# Patient Record
Sex: FEMALE | Race: WHITE | Employment: UNEMPLOYED | URBAN - METROPOLITAN AREA
[De-identification: names, ages, dates, MRNs, and addresses within clinical notes are randomized per-mention and may not be internally consistent; named-entity substitution may affect disease eponyms.]

---

## 2017-07-26 ENCOUNTER — HOSPITAL ENCOUNTER (OUTPATIENT)
Dept: PHYSICAL THERAPY | Age: 57
Discharge: HOME | End: 2017-07-26
Payer: OTHER GOVERNMENT

## 2017-07-26 PROCEDURE — 97530 THERAPEUTIC ACTIVITIES: CPT

## 2017-07-26 PROCEDURE — 97161 PT EVAL LOW COMPLEX 20 MIN: CPT

## 2017-07-26 NOTE — PROGRESS NOTES
[facility] at [address] [place] [address]  Phone: [phone]   Fax: [phone]    Plan of Care/ Statement of Necessity for Physical Therapy Services     Patient name: Serafin Trammell Start of Care: 2017   Referral source: Marybel Morrissey, * : 1960    Medical Diagnosis: m54.2   Onset Date:chronic for 5 years    Treatment Diagnosis: cervical spine pain   Prior Hospitalization: see medical history Provider#: [id number]   Medications: Verified on Patient summary List    Comorbidities: fibromyalgia, panic attacks   Prior Level of Function: chronic neck pain, 5 years    The Plan of Care and following information is based on the information from the initial evaluation. Assessment/ key information: Pt is a 56 y.o. female presenting to therapy with neck pain and diagnosis of cervicalgia per MD script. Pt's pain has been chronic in nature, >5 years, no improvements or exacerbations. Pt impairments include minor limitations and pain with ROM, tenderness to right UT, tightness in scalenes and SCM, decreased MT and LT strength, high pain, and reports of intermittent UE tingling (right>left). Pt functional limitations include pain/difficulty looking up, watching TV, looking down to read, lifting heavy objects, carrying purse/groceries. Pt would benefit from skilled PT to improve impairments listed above and return pt to prior level of function of less pain with daily activities.    Evaluation Complexity History MEDIUM  Complexity : 1-2 comorbidities / personal factors will impact the outcome/ POC ; Examination HIGH Complexity : 4+ Standardized tests and measures addressing body structure, function, activity limitation and / or participation in recreation  ;Presentation LOW Complexity : Stable, uncomplicated  ;Clinical Decision Making MEDIUM Complexity : FOTO score of 26-74  Overall Complexity Rating: LOW   Problem List: pain affecting function, decrease ROM, decrease strength, decrease ADL/ functional abilitiies, decrease activity tolerance and decrease flexibility/ joint mobility   Treatment Plan may include any combination of the following: Therapeutic exercise, Therapeutic activities, Neuromuscular re-education, Physical agent/modality, Manual therapy, Patient education and Self Care training  Patient / Family readiness to learn indicated by: asking questions, trying to perform skills and interest  Persons(s) to be included in education: patient (P)  Barriers to Learning/Limitations: None  Patient Goal (s): Definately less pain, I don't know if cervical disease can be cured or not. Mainly for me the pain. Not be so stiff.   Patient Self Reported Health Status: poor  Rehabilitation Potential: fair-good    Short Term Goals: STG- To be accomplished in 4 week(s):  1. Pt will be independent with HEP to encourage prophylaxis. Eval:to dispensed visit 2-3  Current: NA     2.  Pt will report pain at worst does not exceed 6/10 to improve everyday activities. Eval:8/10  Current: NA     Long Term Goals: LTG- To be accomplished in 6 week(s):  1. Pt will report pain at worst does not exceed 4/10 to allow pt to watch TV or read with no restrictions. Eval:8/10  Current: NA     2.  Pt will report >50% improvement in UE symptoms to allow pt to improve and normalize ADLs. Eval:basline  Current: NA     3. Pt MT/LT strength will improve to 4/5 to allow pt to lift and carry groceries with less restrictions. Eval:3+ LT, 4 MT  Current: NA  Frequency / Duration: Patient to be seen 2 times per week for 6 weeks. Patient/ Caregiver education and instruction: Diagnosis, prognosis, self care, activity modification and exercises   [x]  Plan of care has been reviewed with MIKEY Miller, PT 7/26/2017 3:18 PM  _____________________________________________________________________  I certify that the above Therapy Services are being furnished while the patient is under my care. I agree with the treatment plan and certify that this therapy is necessary.     Physician's Signature:____________________  Date:__________Time:______    Please sign and return to [facility] at [address] [place] [address]  Phone: [phone]   Fax: [phone]

## 2017-07-26 NOTE — PROGRESS NOTES
PT DAILY TREATMENT NOTE/CERVICAL EVAL3-16    Patient Name: Isis Cook  Date:2017  : 1960  [x]  Patient  Verified  Payor:  / Plan: [facility]  RETIREES AND DEPENDENTS / Product Type:  /    In [facility] time:348  Total Treatment Time (min): 24  Visit #: 1 of 12    Treatment Area: m54.2    SUBJECTIVE  Pain Level (0-10 scale): 4  []constant []intermittent []improving []worsening []no change since onset    Any medication changes, allergies to medications, adverse drug reactions, diagnosis change, or new procedure performed?: [x] No    [] Yes (see summary sheet for update)  Subjective functional status/changes:     Pain:  _8__/10 max       _1_/10 min     __4__/10 now    Location: lower cervical spine, right UE to elbow tingling>left     [X] Sharp    [ ] Sheryl Sill [ ] Maryl Jasiel [X]  Lina Barrow [ ] Saima.Big [ ] Simona Campuzanoly [ ] Other:        [X]  Shoshone.Humberto [ ] Intermittent        Pain at night- denies    Social/Recreation       Hobbies-        Pool          Aggravating factors- looking up, watching, looking down to read, lifting heavy, carrying purse/groceries  Relieving factors      PLOF: chronic neck pain, 5 years  Limitations to PLOF: pain  Mechanism of Injury: Pt reports neck pain 5 years, no improvements or flare up. Pt had MRI 1 year ago with reports of degenerative disc disease. Current symptoms/Complaints:   Previous Treatment/Compliance: nerve ablation 9 mo ago in neck with worsening,   PMHx/Surgical Hx: denies  Work Hx: not working  Living Situation: lives with spouse and grand daughter  Pt Goals: \"Definately less pain, I don't know if cervical disease can be cured or not. Mainly for me the pain. Not be so stiff. \"  Barriers: []pain []financial []time []transportation []other  Motivation: good  Substance use: []Alcohol []Tobacco []other:   FABQ Score: []low []elevate  Cognition: A & O x 4    Other:    OBJECTIVE/EXAMINATION    16 min [x]Eval []Re-Eval       8 min Therapeutic Activity:  []  See flow sheet : Discussed and reviewed diagnosis, prognosis, POC   Rationale: increase ROM, increase strength and improve coordination  to improve the patients ability to perform ADL's with reduced pain levels.           With   [] TE   [] TA   [] neuro   [] other: Patient Education: [x] Review HEP    [] Progressed/Changed HEP based on:   [] positioning   [] body mechanics   [] transfers   [] heat/ice application    [] other:      Physical Therapy Evaluation Cervical Spine     OBJECTIVE  Posture: [] WNL  Head Position:  Shoulder/Scapular Position:  C-Kyphosis:  [] increased   [] decreased   C-Lordosis:   [] increased   [] decreased  T-Kyphosis:  [] increased   [] decreased  T-Lordosis:   [] increased   [] decreased     TMJ: [] N/A [] Abnormal - ROM:   Palpation:    Cervical Retraction: [x] WNL    [] Abnormal:    Shoulder/Scapular Screen: [x] WNL    [] Abnormal:    Active Movements: [] N/A   [] Too acute   [] Other:  ROM % AROM % PROM Comments:pain, area   Forward flexion 40     Extension 44     SB right 31     SB left  33     Rotation right 59     Rotation left 58       Thoracic Spine: [] N/A    [] WNL   [] Other:    PROM:    Palpation:  [] Min  [] Mod  [] Severe    Location:  [] Min  [] Mod  [] Severe    Location:  [] Min  [] Mod  [] Severe    Location:    Neuro Screen (myotome/dematome/felexes): [] WNL  Myotome Level Muscle Test Myotome Level Muscle Test   C5 Shoulder Adduction - Deltoid C8 Finger Flexors   C6 Wrist Extension T1 Finger Abduction - Interossei   C7 Elbow Extension     Comments:  Upper Limb Tension Tests: [] N/A       Ulnar: [] R    [] L    [] +    [] -       Median: [] R    [] L    [] +    [] -       Radial: [] R    [] L    [] +    [] -    Special Tests:  Cervical:        Vertebral Artery:  [] R    [] L    [] +    [] -       Alar Ligament: [] R    [] L    [] +    [] -       Transverse Lig: [] R    [] L    [] +    [] - Spurling's:  [x] R    [x] L    [] +    [x] -       Distraction:  [] R    [] L    [] +    [] -       Compression: [x] R    [x] L    [] +    [x] -    Thoracic Outlet Tests: [] N/A       Adson's:  [] R    [] L    [] +    [] -       Hyperabduction: [] R    [] L    [] +    [] -       Issac's:  [] R    [] L    [] +    [] -       Mecca:  [] R    [] L    [] +    [] -    Diaphragmatic Breathing: [] Normal    [] Abnormal    Muscle Flexibility: [] N/A   Scalenes: [] WNL    [x] Tight    [x] R    [x] L   Upper Trap: [] WNL    [x] Tight    [x] R  <  [x] L   Levator: [] WNL    [] Tight    [] R    [] L   Scalenes/SCM: [] WNL    [x] Tight    [x] R    [x] L    Global Muscular Weakness: [] N/A   Lower Trap: 3+   Rhomboids:   Middle Trap:4   Serratus Ant:   Ext Rotators: Other:    Other tests/comments:       Pain Level (0-10 scale) post treatment: 3    ASSESSMENT/Changes in Function: Pt is a 56 y.o. female presenting to therapy with neck pain and diagnosis of cervicalgia per MD script. Pt's pain has been chronic in nature, >5 years, no improvements or exacerbation. Pt impairments include minor limitations and pain with ROM, tenderness to right UT, tightness in scalenes and SCM, decreased MT and LT strength, high pain, and reports of intermittent UE tingling (right>left). Pt functional limitations include pain/difficulty looking up, watching, looking down to read, lifting heavy, carrying purse/groceries. Pt would benefit from skilled PT to improve impairments listed above and return pt to prior level of function of less pain with daily activities.      Patient will continue to benefit from skilled PT services to modify and progress therapeutic interventions, address functional mobility deficits, address ROM deficits, address strength deficits, analyze and address soft tissue restrictions, analyze and cue movement patterns, analyze and modify body mechanics/ergonomics, assess and modify postural abnormalities and instruct in home and community integration to attain remaining goals. [x]  See Plan of Care  []  See progress note/recertification  []  See Discharge Summary         Progress towards goals / Updated goals:  Short Term Goals: STG- To be accomplished in 4 week(s):  1. Pt will be independent with HEP to encourage prophylaxis. Eval:to dispensed visit 2-3  Current: NA    2. Pt will report pain at worst does not exceed 6/10 to improve everyday activities. Eval:8/10  Current: NA    Long Term Goals: LTG- To be accomplished in 6 week(s):  1. Pt will report pain at worst does not exceed 4/10 to allow pt to watch TV or read with no restrictions. Eval:8/10  Current: NA    2. Pt will report >50% improvement in UE symptoms to allow pt to improve and normalize ADLs. Eval:basline  Current: NA    3. Pt MT/LT strength will improve to 4/5 to allow pt to lift and carry groceries with less restrictions.   Eval:3+ LT, 4 MT  Current: NA      PLAN  [x]  Upgrade activities as tolerated     [x]  Continue plan of care  []  Update interventions per flow sheet       []  Discharge due to:_  []  Other:_      Shanel Novak, PT 7/26/2017  3:18 PM

## 2017-08-01 ENCOUNTER — HOSPITAL ENCOUNTER (OUTPATIENT)
Dept: PHYSICAL THERAPY | Age: 57
Discharge: HOME | End: 2017-08-01
Payer: OTHER GOVERNMENT

## 2017-08-01 PROCEDURE — 97110 THERAPEUTIC EXERCISES: CPT

## 2017-08-01 NOTE — PROGRESS NOTES
PT DAILY TREATMENT NOTE     Patient Name: Jordin Lawson  Date:2017  : 1960  [x]  Patient  Verified  Payor:  / Plan: [facility]  RETIREES AND DEPENDENTS / Product Type: Lilli Hope /    In time:6:08  Out time:06:53  Total Treatment Time (min): 45  Visit #: 2 of 12    Treatment Area: Cervicalgia [M54.2]    SUBJECTIVE  Pain Level (0-10 scale): 1/10  Any medication changes, allergies to medications, adverse drug reactions, diagnosis change, or new procedure performed?: [x] No    [] Yes (see summary sheet for update)  Subjective functional status/changes:   [x] No changes reported      OBJECTIVE    45 min Therapeutic Exercise:  [] See flow sheet :   Rationale: increase ROM, increase strength and improve coordination to improve the patients ability to maneuver neck           With   [] TE   [] TA   [] neuro   [] other: Patient Education: [x] Review HEP    [] Progressed/Changed HEP based on:   [] positioning   [] body mechanics   [] transfers   [] heat/ice application    [] other:      Other Objective/Functional Measures:      Pain Level (0-10 scale) post treatment: 1/10    ASSESSMENT/Changes in Function:   Patient required tactile/vc's on form with exercises. Patient provided with HEP with focus on cervical stretches. Patient will continue to benefit from skilled PT services to modify and progress therapeutic interventions, address functional mobility deficits, address ROM deficits and address strength deficits to attain remaining goals. []  See Plan of Care  []  See progress note/recertification  []  See Discharge Summary         Progress towards goals / Updated goals:  Short Term Goals: STG- To be accomplished in 4 week(s):  1.  Pt will be independent with HEP to encourage prophylaxis. Eval:to dispensed visit 2-3  Current: initiated HEP      2.  Pt will report pain at worst does not exceed 6/10 to improve everyday activities.   Eval:8/10  Current: 1/10 pain with exercises today      Long Term Goals: LTG- To be accomplished in 6 week(s):  1.  Pt will report pain at worst does not exceed 4/10 to allow pt to watch TV or read with no restrictions. Eval:8/10  Current: NA      2.  Pt will report >50% improvement in UE symptoms to allow pt to improve and normalize ADLs. Eval:basline  Current: NA      3.  Pt MT/LT strength will improve to 4/5 to allow pt to lift and carry groceries with less restrictions.   Eval:3+ LT, 4 MT  Current: NA    PLAN  []  Upgrade activities as tolerated     []  Continue plan of care  []  Update interventions per flow sheet       []  Discharge due to:_  []  Other:_      Lavenia Curling 8/1/2017  6:24 PM    Future Appointments  Date Time Provider Rickey Tom   8/3/2017 4:00 PM Irina Payne [facility]   8/8/2017 5:30 PM Latasha Padgett [facility]   8/10/2017 5:30 PM FARA PadgettHPNICKIE [facility]

## 2017-08-03 ENCOUNTER — HOSPITAL ENCOUNTER (OUTPATIENT)
Dept: PHYSICAL THERAPY | Age: 57
Discharge: HOME | End: 2017-08-03
Payer: OTHER GOVERNMENT

## 2017-08-03 PROCEDURE — 97110 THERAPEUTIC EXERCISES: CPT

## 2017-08-03 NOTE — PROGRESS NOTES
PT DAILY TREATMENT NOTE     Patient Name: Serafin Trammell  Date:8/3/2017  : 1960  [x]  Patient  Verified  Payor:  / Plan: [facility]  RETIREES AND DEPENDENTS / Product Type:  /    In time:403  Out time:449  Total Treatment Time (min): 55  Visit #: 3 of 12    Treatment Area: Cervicalgia [M54.2]    SUBJECTIVE  Pain Level (0-10 scale): 1  Any medication changes, allergies to medications, adverse drug reactions, diagnosis change, or new procedure performed?: [x] No    [] Yes (see summary sheet for update)  Subjective functional status/changes:   [] No changes reported  \"I'm sore in my shoulder blades. \"    OBJECTIVE    Modality rationale: decrease pain and increase tissue extensibility to improve the patients ability to perform ADL's with reduced pain levels.     Min Type Additional Details    [] Estim:  []Unatt       []IFC  []Premod                        []Other:  []w/ice   []w/heat  Position:  Location:    [] Estim: []Att    []TENS instruct  []NMES                    []Other:  []w/US   []w/ice   []w/heat  Position:  Location:    []  Traction: [] Cervical       []Lumbar                       [] Prone          []Supine                       []Intermittent   []Continuous Lbs:  [] before manual  [] after manual    []  Ultrasound: []Continuous   [] Pulsed                           []1MHz   []3MHz W/cm2:  Location:    []  Iontophoresis with dexamethasone         Location: [] Take home patch   [] In clinic   5 []  Ice     [x]  heat  []  Ice massage  []  Laser   []  Anodyne Position:  supine  Location:c-spine    []  Laser with stim  []  Other:  Position:  Location:    []  Vasopneumatic Device Pressure:       [] lo [] med [] hi   Temperature: [] lo [] med [] hi   [x] Skin assessment post-treatment:  [x]intact []redness- no adverse reaction    []redness - adverse reaction:     41 min Therapeutic Exercise:  [x] See flow sheet :   Rationale: increase ROM, increase strength, improve coordination, improve balance and increase proprioception to improve the patients ability to perform ADL's with reduced pain levels. With   [] TE   [] TA   [] neuro   [] other: Patient Education: [x] Review HEP    [] Progressed/Changed HEP based on:   [] positioning   [] body mechanics   [] transfers   [] heat/ice application    [] other:      Other Objective/Functional Measures:  Limited SB ROM    Pain Level (0-10 scale) post treatment: 1    ASSESSMENT/Changes in Function: Challenged with all exercises today but able to perform all with no adverse reactions. Added rhomboid stretch due to soreness in scapular region. Patient will continue to benefit from skilled PT services to modify and progress therapeutic interventions, address functional mobility deficits, address ROM deficits, address strength deficits, analyze and address soft tissue restrictions, analyze and cue movement patterns, analyze and modify body mechanics/ergonomics, assess and modify postural abnormalities and instruct in home and community integration to attain remaining goals. []  See Plan of Care  []  See progress note/recertification  []  See Discharge Summary         Progress towards goals / Updated goals:  Short Term Goals: STG- To be accomplished in 4 week(s):  1.  Pt will be independent with HEP to encourage prophylaxis. Eval: dispensed visit 2  Current: compliance      2.  Pt will report pain at worst does not exceed 6/10 to improve everyday activities. Eval:8/10  Current: 1/10 pain with exercises today      Long Term Goals: LTG- To be accomplished in 6 week(s):  1.  Pt will report pain at worst does not exceed 4/10 to allow pt to watch TV or read with no restrictions. Eval:8/10  Current: NA      2.  Pt will report >50% improvement in UE symptoms to allow pt to improve and normalize ADLs. Eval:basline  Current: NA      3.  Pt MT/LT strength will improve to 4/5 to allow pt to lift and carry groceries with less restrictions.   Eval:3+ LT, 4 MT  Current: NA    PLAN  []  Upgrade activities as tolerated     [x]  Continue plan of care  []  Update interventions per flow sheet       []  Discharge due to:_  []  Other:_      Abhijeet Hoffman, PT 8/3/2017  4:13 PM    Future Appointments  Date Time Provider Rickey Tom   8/8/2017 5:30 PM Aman EAGLE [facility]   8/10/2017 5:30 PM Lenore Moya, FARA EAGLE [facility]

## 2017-08-08 ENCOUNTER — HOSPITAL ENCOUNTER (OUTPATIENT)
Dept: PHYSICAL THERAPY | Age: 57
End: 2017-08-08
Payer: OTHER GOVERNMENT

## 2017-08-10 ENCOUNTER — HOSPITAL ENCOUNTER (OUTPATIENT)
Dept: PHYSICAL THERAPY | Age: 57
Discharge: HOME | End: 2017-08-10
Payer: OTHER GOVERNMENT

## 2017-08-10 PROCEDURE — 97110 THERAPEUTIC EXERCISES: CPT

## 2017-08-10 NOTE — PROGRESS NOTES
PT DAILY TREATMENT NOTE     Patient Name: Willma Soulier  Date:8/10/2017  : 1960  [x]  Patient  Verified  Payor:  / Plan: [facility]  RETIREES AND DEPENDENTS / Product Type:  /    In time:1240  Out time:130  Total Treatment Time (min): 50  Visit #: 4 of 12    Treatment Area: Cervicalgia [M54.2]    SUBJECTIVE  Pain Level (0-10 scale): 1/10  Any medication changes, allergies to medications, adverse drug reactions, diagnosis change, or new procedure performed?: [x] No    [] Yes (see summary sheet for update)  Subjective functional status/changes:   [x] No changes reported      OBJECTIVE    Modality rationale: decrease pain and increase tissue extensibility to improve the patients ability to increase/activity/position tolerance   Min Type Additional Details    [] Estim:  []Unatt       []IFC  []Premod                        []Other:  []w/ice   []w/heat  Position:  Location:    [] Estim: []Att    []TENS instruct  []NMES                    []Other:  []w/US   []w/ice   []w/heat  Position:  Location:    []  Traction: [] Cervical       []Lumbar                       [] Prone          []Supine                       []Intermittent   []Continuous Lbs:  [] before manual  [] after manual    []  Ultrasound: []Continuous   [] Pulsed                           []1MHz   []3MHz W/cm2:  Location:    []  Iontophoresis with dexamethasone         Location: [] Take home patch   [] In clinic   10 []  Ice     [x]  heat  []  Ice massage  []  Laser   []  Anodyne Position: H/L supine  Location:neck/upper back    []  Laser with stim  []  Other:  Position:  Location:    []  Vasopneumatic Device Pressure:       [] lo [] med [] hi   Temperature: [] lo [] med [] hi   [] Skin assessment post-treatment:  []intact []redness- no adverse reaction    []redness - adverse reaction:      min []Eval                  []Re-Eval       40 min Therapeutic Exercise:  [x] See flow sheet :   Rationale: increase ROM and increase strength to improve the patients ability to normalize ADL's     min Therapeutic Activity:  []  See flow sheet :         min Neuromuscular Re-education:  []  See flow sheet :        min Manual Therapy:          min Gait Training:  ___ feet with ___ device on level surfaces with ___ level of assist   Rationale: With   [] TE   [] TA   [] neuro   [] other: Patient Education: [x] Review HEP    [] Progressed/Changed HEP based on:   [] positioning   [] body mechanics   [] transfers   [] heat/ice application    [] other:      Other Objective/Functional Measures: none taken today     Pain Level (0-10 scale) post treatment: 0/10    ASSESSMENT/Changes in Function: No new progress. Patient will continue to benefit from skilled PT services to modify and progress therapeutic interventions, address ROM deficits, address strength deficits, analyze and address soft tissue restrictions, analyze and cue movement patterns, analyze and modify body mechanics/ergonomics and assess and modify postural abnormalities to attain remaining goals. []  See Plan of Care  []  See progress note/recertification  []  See Discharge Summary         Progress towards goals / Updated goals:  Short Term Goals: STG- To be accomplished in 4 week(s):  1.  Pt will be independent with HEP to encourage prophylaxis. Eval: dispensed visit 2  Current: compliance      2.  Pt will report pain at worst does not exceed 6/10 to improve everyday activities. Eval:8/10  Current: 1/10 pain with exercises today      Long Term Goals: LTG- To be accomplished in 6 week(s):  1.  Pt will report pain at worst does not exceed 4/10 to allow pt to watch TV or read with no restrictions. Eval:8/10  Current: NA      2.  Pt will report >50% improvement in UE symptoms to allow pt to improve and normalize ADLs. Eval:basline  Current: NA      3.  Pt MT/LT strength will improve to 4/5 to allow pt to lift and carry groceries with less restrictions.   Eval:3+ LT, 4 MT  Current: NA    PLAN  [x]  Upgrade activities as tolerated     [x]  Continue plan of care  []  Update interventions per flow sheet       []  Discharge due to:_  [x]  Other: Hannah Saldana next visit      Shubham Carbajal PT 8/10/2017  12:42 PM    Future Appointments  Date Time Provider Rickey Tom   8/11/2017 8:00 AM [place] [facility]

## 2017-08-11 ENCOUNTER — HOSPITAL ENCOUNTER (OUTPATIENT)
Dept: PHYSICAL THERAPY | Age: 57
Discharge: HOME | End: 2017-08-11
Payer: OTHER GOVERNMENT

## 2017-08-11 PROCEDURE — 97110 THERAPEUTIC EXERCISES: CPT

## 2017-08-11 NOTE — PROGRESS NOTES
PT DAILY TREATMENT NOTE     Patient Name: Esther Harkins  Date:2017  : 1960  [x]  Patient  Verified  Payor:  / Plan: [facility]  RETIREES AND DEPENDENTS / Product Type: [address] [place] [address] /    In time:08:05  Out time:08:55  Total Treatment Time (min): 50  Visit #: 5 of 12    Treatment Area: Cervicalgia [M54.2]    SUBJECTIVE  Pain Level (0-10 scale): 0/10  Any medication changes, allergies to medications, adverse drug reactions, diagnosis change, or new procedure performed?: [x] No    [] Yes (see summary sheet for update)  Subjective functional status/changes:   [x] No changes reported      OBJECTIVE    Modality rationale: decrease pain and increase tissue extensibility to improve the patients ability to maneuver neck    Min Type Additional Details    [] Estim:  []Unatt       []IFC  []Premod                        []Other:  []w/ice   []w/heat  Position:  Location:    [] Estim: []Att    []TENS instruct  []NMES                    []Other:  []w/US   []w/ice   []w/heat  Position:  Location:    []  Traction: [] Cervical       []Lumbar                       [] Prone          []Supine                       []Intermittent   []Continuous Lbs:  [] before manual  [] after manual    []  Ultrasound: []Continuous   [] Pulsed                           []1MHz   []3MHz W/cm2:  Location:    []  Iontophoresis with dexamethasone         Location: [] Take home patch   [] In clinic   10 []  Ice     [x]  heat  []  Ice massage  []  Laser   []  Anodyne Position:supine  Location:cervical and shoulder    []  Laser with stim  []  Other:  Position:  Location:    []  Vasopneumatic Device Pressure:       [] lo [] med [] hi   Temperature: [] lo [] med [] hi   [] Skin assessment post-treatment:  []intact []redness- no adverse reaction    []redness - adverse reaction:     40 min Therapeutic Exercise:  [x] See flow sheet :   Rationale: increase ROM, increase strength and improve coordination to improve the patients ability to maneuver neck      Other Objective/Functional Measures: FOTO: 66     Pain Level (0-10 scale) post treatment: 0/10    ASSESSMENT/Changes in Function:   Patient required vc's on some exercises to maintain proper form. PT progressed HEP focusing on strengthening as limited progress noted there. Patient will continue to benefit from skilled PT services to modify and progress therapeutic interventions, address functional mobility deficits, address ROM deficits, address strength deficits and analyze and address soft tissue restrictions to attain remaining goals. []  See Plan of Care  []  See progress note/recertification  []  See Discharge Summary         Progress towards goals / Updated goals:  Short Term Goals: STG- To be accomplished in 4 week(s):  1.  Pt will be independent with HEP to encourage prophylaxis. Eval: dispensed visit 2  Current: compliance      2.  Pt will report pain at worst does not exceed 6/10 to improve everyday activities. Eval:8/10  Current: 0/10 pain with exercises today, 7/10 at worst      Long Term Goals: LTG- To be accomplished in 6 week(s):  1.  Pt will report pain at worst does not exceed 4/10 to allow pt to watch TV or read with no restrictions. Eval:8/10  Current: at worst 7/10      2.  Pt will report >50% improvement in UE symptoms to allow pt to improve and normalize ADLs. Eval:basline  Current: 20%      3.  Pt MT/LT strength will improve to 4/5 to allow pt to lift and carry groceries with less restrictions. Eval:3+ LT, 4 MT  Current: no change    PLAN  []  Upgrade activities as tolerated     [x]  Continue plan of care  []  Update interventions per flow sheet       []  Discharge due to:_  []  Other:_      Frankie Atkinson 8/11/2017  8:08 AM    No future appointments.

## 2017-08-15 ENCOUNTER — HOSPITAL ENCOUNTER (OUTPATIENT)
Dept: PHYSICAL THERAPY | Age: 57
Discharge: HOME | End: 2017-08-15
Payer: OTHER GOVERNMENT

## 2017-08-15 PROCEDURE — 97140 MANUAL THERAPY 1/> REGIONS: CPT

## 2017-08-15 PROCEDURE — 97110 THERAPEUTIC EXERCISES: CPT

## 2017-08-15 NOTE — PROGRESS NOTES
PT DAILY TREATMENT NOTE     Patient Name: Donnell Hill  Date:8/15/2017  : 1960  [x]  Patient  Verified  Payor:  / Plan: [facility]  RETIREES AND DEPENDENTS / Product Type:  /    In time:502  Out time:552  Total Treatment Time (min): 50  Visit #: 6 of 12    Treatment Area: Neck pain [M54.2]    SUBJECTIVE  Pain Level (0-10 scale): 2/10  Any medication changes, allergies to medications, adverse drug reactions, diagnosis change, or new procedure performed?: [x] No    [] Yes (see summary sheet for update)  Subjective functional status/changes:   [x] No changes reported      OBJECTIVE    Modality rationale: decrease pain and increase tissue extensibility to improve the patients ability to increase activity/position tolerance   Min Type Additional Details    [] Estim:  []Unatt       []IFC  []Premod                        []Other:  []w/ice   []w/heat  Position:  Location:    [] Estim: []Att    []TENS instruct  []NMES                    []Other:  []w/US   []w/ice   []w/heat  Position:  Location:    []  Traction: [] Cervical       []Lumbar                       [] Prone          []Supine                       []Intermittent   []Continuous Lbs:  [] before manual  [] after manual    []  Ultrasound: []Continuous   [] Pulsed                           []1MHz   []3MHz W/cm2:  Location:    []  Iontophoresis with dexamethasone         Location: [] Take home patch   [] In clinic   10 []  Ice     [x]  heat  []  Ice massage  []  Laser   []  Anodyne Position:H/L supine  Location:neck and upper back    []  Laser with stim  []  Other:  Position:  Location:    []  Vasopneumatic Device Pressure:       [] lo [] med [] hi   Temperature: [] lo [] med [] hi   [] Skin assessment post-treatment:  []intact []redness- no adverse reaction    []redness - adverse reaction:      min []Eval                  []Re-Eval       32 min Therapeutic Exercise:  [x] See flow sheet :   Rationale: increase ROM and increase strength to improve the patients ability to normalize ADL's     min Therapeutic Activity:  []  See flow sheet :         min Neuromuscular Re-education:  []  See flow sheet :       8 min Manual Therapy:  DTM/TPR to right rhomboid region in left S/L   Rationale: decrease pain, increase ROM, increase tissue extensibility and decrease trigger points to normalize function     min Gait Training:  ___ feet with ___ device on level surfaces with ___ level of assist   Rationale: With   [] TE   [] TA   [] neuro   [] other: Patient Education: [x] Review HEP    [] Progressed/Changed HEP based on:   [] positioning   [] body mechanics   [] transfers   [] heat/ice application    [] other:      Other Objective/Functional Measures:   TTP right rhomboid     Pain Level (0-10 scale) post treatment: 1/10    ASSESSMENT/Changes in Function: Pt benefited from manual techniques and heat to address pain. Patient will continue to benefit from skilled PT services to modify and progress therapeutic interventions, address ROM deficits, address strength deficits, analyze and address soft tissue restrictions, analyze and cue movement patterns, analyze and modify body mechanics/ergonomics and assess and modify postural abnormalities to attain remaining goals. []  See Plan of Care  []  See progress note/recertification  []  See Discharge Summary         Progress towards goals / Updated goals:  Short Term Goals: STG- To be accomplished in 4 week(s):  1.  Pt will be independent with HEP to encourage prophylaxis. Eval: dispensed visit 2  Current: compliance      2.  Pt will report pain at worst does not exceed 6/10 to improve everyday activities. Eval:8/10  Current: 0/10 pain with exercises today, 7/10 at worst      Long Term Goals: LTG- To be accomplished in 6 week(s):  1.  Pt will report pain at worst does not exceed 4/10 to allow pt to watch TV or read with no restrictions.   Eval:8/10  Current: at worst 7/10      2.  Pt will report >50% improvement in UE symptoms to allow pt to improve and normalize ADLs. Eval:basline  Current: 20%      3.  Pt MT/LT strength will improve to 4/5 to allow pt to lift and carry groceries with less restrictions.   Eval:3+ LT, 4 MT  Current: no change    PLAN  [x]  Upgrade activities as tolerated     [x]  Continue plan of care  []  Update interventions per flow sheet       []  Discharge due to:_  [x]  Other: pt awaiting insurance authorization for her low back to be evaluated    Nicky Azevedo PT 8/15/2017  5:03 PM    Future Appointments  Date Time Provider Rickey Tom   8/17/2017 6:00 PM [facility]

## 2017-08-17 ENCOUNTER — HOSPITAL ENCOUNTER (OUTPATIENT)
Dept: PHYSICAL THERAPY | Age: 57
Discharge: HOME | End: 2017-08-17
Payer: OTHER GOVERNMENT

## 2017-08-17 PROCEDURE — 97110 THERAPEUTIC EXERCISES: CPT

## 2017-08-17 NOTE — PROGRESS NOTES
PT DAILY TREATMENT NOTE     Patient Name: Isai Campbell  Date:2017  : 1960  [x]  Patient  Verified  Payor:  / Plan: [facility]  RETIREES AND DEPENDENTS / Product Type:  /    In time:552  Out time:640  Total Treatment Time (min): 48  Visit #: 7 of 12    Treatment Area: Neck pain [M54.2]    SUBJECTIVE  Pain Level (0-10 scale): 0/10  Any medication changes, allergies to medications, adverse drug reactions, diagnosis change, or new procedure performed?: [x] No    [] Yes (see summary sheet for update)  Subjective functional status/changes:   [] No changes reported  I'm going to get a cortisone injection to my back and then depending on how that goes, might do another MRI.     OBJECTIVE    Modality rationale: decrease pain and increase tissue extensibility to improve the patients ability to increase activity/position tolerance   Min Type Additional Details    [] Estim:  []Unatt       []IFC  []Premod                        []Other:  []w/ice   []w/heat  Position:  Location:    [] Estim: []Att    []TENS instruct  []NMES                    []Other:  []w/US   []w/ice   []w/heat  Position:  Location:    []  Traction: [] Cervical       []Lumbar                       [] Prone          []Supine                       []Intermittent   []Continuous Lbs:  [] before manual  [] after manual    []  Ultrasound: []Continuous   [] Pulsed                           []1MHz   []3MHz W/cm2:  Location:    []  Iontophoresis with dexamethasone         Location: [] Take home patch   [] In clinic    []  Ice     []  heat  []  Ice massage  []  Laser   []  Anodyne Position:  Location:    []  Laser with stim  []  Other:  Position:  Location:    []  Vasopneumatic Device Pressure:       [] lo [] med [] hi   Temperature: [] lo [] med [] hi   [] Skin assessment post-treatment:  []intact []redness- no adverse reaction    []redness - adverse reaction:      min []Eval                  []Re-Eval       38 min Therapeutic Exercise:  [x] See flow sheet :   Rationale: increase ROM and increase strength to improve the patients ability to normalize ADL's     min Therapeutic Activity:  []  See flow sheet :         min Neuromuscular Re-education:  []  See flow sheet :        min Manual Therapy:          min Gait Training:  ___ feet with ___ device on level surfaces with ___ level of assist   Rationale: With   [] TE   [] TA   [] neuro   [] other: Patient Education: [x] Review HEP    [] Progressed/Changed HEP based on:   [] positioning   [] body mechanics   [] transfers   [] heat/ice application    [] other:      Other Objective/Functional Measures: none taken today     Pain Level (0-10 scale) post treatment: 0/10    ASSESSMENT/Changes in Function: No new progress. Patient will continue to benefit from skilled PT services to modify and progress therapeutic interventions, address ROM deficits, address strength deficits, analyze and address soft tissue restrictions, analyze and cue movement patterns, analyze and modify body mechanics/ergonomics and assess and modify postural abnormalities to attain remaining goals. []  See Plan of Care  []  See progress note/recertification  []  See Discharge Summary         Progress towards goals / Updated goals:  Short Term Goals: STG- To be accomplished in 4 week(s):  1.  Pt will be independent with HEP to encourage prophylaxis. Eval: dispensed visit 2  Current: compliance      2.  Pt will report pain at worst does not exceed 6/10 to improve everyday activities. Eval:8/10  Current: 0/10 pain with exercises today, 7/10 at worst      Long Term Goals: LTG- To be accomplished in 6 week(s):  1.  Pt will report pain at worst does not exceed 4/10 to allow pt to watch TV or read with no restrictions. Eval:8/10  Current: at worst 7/10      2.  Pt will report >50% improvement in UE symptoms to allow pt to improve and normalize ADLs.   Eval:basline  Current: 20%      3.  Pt MT/LT strength will improve to 4/5 to allow pt to lift and carry groceries with less restrictions. Eval:3+ LT, 4 MT  Current: no change    PLAN  [x]  Upgrade activities as tolerated     [x]  Continue plan of care  []  Update interventions per flow sheet       []  Discharge due to:_  []  Other:_      Lenore Moya PT 8/17/2017  6:05 PM    No future appointments.

## 2017-08-24 ENCOUNTER — HOSPITAL ENCOUNTER (OUTPATIENT)
Dept: PHYSICAL THERAPY | Age: 57
Discharge: HOME | End: 2017-08-24
Payer: OTHER GOVERNMENT

## 2017-08-24 PROCEDURE — 97110 THERAPEUTIC EXERCISES: CPT

## 2017-08-24 NOTE — PROGRESS NOTES
[facility] at [address] [place] [address]  Phone: [phone]   Fax: [phone]    Discharge Summary    Patient name: Dianne Adrian     Start of Care: 2017  Referral source: Saurabh Wakefield, *    : 1960  Medical/Treatment Diagnosis: Neck pain [M54.2]  Onset Date:chronic for 5 years  Prior Hospitalization: see medical history   Provider#: [id number]  Comorbidities: fibromyalgia, panic attacks   Prior Level of Function: chronic neck pain, 5 years  Medications: Verified on Patient Summary List    Visits from Start of Care: 8    Missed Visits: 1  Reporting Period : 2017 to 2017    Short Term Goals: STG- To be accomplished in 4 week(s):  1.  Pt will be independent with HEP to encourage prophylaxis. Eval:to dispensed visit 2-3  Discharge: compliant and independent, GOAL MET      2.  Pt will report pain at worst does not exceed 6/10 to improve everyday activities. Eval:8/10  Discharge: 7/10, average 4/10 NOT MET but progressing      Long Term Goals: LTG- To be accomplished in 6 week(s):  1.  Pt will report pain at worst does not exceed 4/10 to allow pt to watch TV or read with no restrictions. Eval:8/10  Discharge: 7/10, average 4/10, NOT MET but progressing      2.  Pt will report >50% improvement in UE symptoms to allow pt to improve and normalize ADLs. Eval:basline  Discharge: 60% improvement, GOAL MET      3.  Pt MT/LT strength will improve to 4/5 to allow pt to lift and carry groceries with less restrictions. Eval:3+ LT, 4 MT  Discharge: 4/5 left, 4+/5 right, GOAL MET       Assessment/ Summary of Care:   Patient ready for discharge at this time. Patient is compliant and independent with HEP. No further skilled PT services indicated at this time.    RECOMMENDATIONS:  [x]Discontinue therapy: [x]Patient has reached or is progressing toward set goals      []Patient is non-compliant or has abdicated      []Due to lack of appreciable progress towards set goals    Nathalia Carlos 8/24/2017 1:25 PM

## 2017-08-24 NOTE — PROGRESS NOTES
PT DAILY TREATMENT NOTE     Patient Name: Anne Castellanos  Date:2017  : 1960  [x]  Patient  Verified  Payor:  / Plan: [facility]  RETIREES AND DEPENDENTS / Product Type:  /    In time:12:38  Out time:01:28  Total Treatment Time (min): 50  Visit #: 8 of 12    Treatment Area: Neck pain [M54.2]    SUBJECTIVE  Pain Level (0-10 scale): 0/10  Any medication changes, allergies to medications, adverse drug reactions, diagnosis change, or new procedure performed?: [x] No    [] Yes (see summary sheet for update)  Subjective functional status/changes:   [x] No changes reported  PT discussed assessment to be done today with possibility of discharge which patient was in agreement with.      OBJECTIVE    Modality rationale: decrease pain and increase tissue extensibility to improve the patients ability to maneuver neck    Min Type Additional Details    [] Estim:  []Unatt       []IFC  []Premod                        []Other:  []w/ice   []w/heat  Position:  Location:    [] Estim: []Att    []TENS instruct  []NMES                    []Other:  []w/US   []w/ice   []w/heat  Position:  Location:    []  Traction: [] Cervical       []Lumbar                       [] Prone          []Supine                       []Intermittent   []Continuous Lbs:  [] before manual  [] after manual    []  Ultrasound: []Continuous   [] Pulsed                           []1MHz   []3MHz W/cm2:  Location:    []  Iontophoresis with dexamethasone         Location: [] Take home patch   [] In clinic   10 []  Ice     [x]  heat  []  Ice massage  []  Laser   []  Anodyne Position: supine  Location: cervical     []  Laser with stim  []  Other:  Position:  Location:    []  Vasopneumatic Device Pressure:       [] lo [] med [] hi   Temperature: [] lo [] med [] hi   [] Skin assessment post-treatment:  []intact []redness- no adverse reaction    []redness  adverse reaction:     40 min Therapeutic Exercise:  [] See flow sheet :   Rationale: increase ROM, increase strength and improve coordination to improve the patients ability to lift items overhead and tolerate prolonged positions          With   [] TE   [] TA   [] neuro   [] other: Patient Education: [x] Review HEP    [] Progressed/Changed HEP based on:   [] positioning   [] body mechanics   [] transfers   [] heat/ice application    [] other:      Other Objective/Functional Measures:   Refer to goals      Pain Level (0-10 scale) post treatment: 0/10    ASSESSMENT/Changes in Function:   Patient ready for discharge at this time. Patient is compliant and independent with HEP. No further skilled PT services indicated at this time. []  See Plan of Care  []  See progress note/recertification  [x]  See Discharge Summary         Progress towards goals / Updated goals:  Short Term Goals: STG- To be accomplished in 4 week(s):  1.  Pt will be independent with HEP to encourage prophylaxis. Eval:to dispensed visit 2-3  Discharge: compliant and independent, GOAL MET      2.  Pt will report pain at worst does not exceed 6/10 to improve everyday activities. Eval:8/10  Discharge: 7/10, average 4/10 NOT MET but progressing      Long Term Goals: LTG- To be accomplished in 6 week(s):  1.  Pt will report pain at worst does not exceed 4/10 to allow pt to watch TV or read with no restrictions. Eval:8/10  Discharge: 7/10, average 4/10, NOT MET but progressing      2.  Pt will report >50% improvement in UE symptoms to allow pt to improve and normalize ADLs. Eval:basline  Discharge: 60% improvement, GOAL MET      3.  Pt MT/LT strength will improve to 4/5 to allow pt to lift and carry groceries with less restrictions. Eval:3+ LT, 4 MT  Discharge: 4/5 left, 4+/5 right, GOAL MET    PLAN  []  Upgrade activities as tolerated     [x]  Continue plan of care  []  Update interventions per flow sheet       [x]  Discharge due to:_goals met  []  Other:_      Frankie Atkinson 8/24/2017  12:55 PM    No future appointments.

## 2017-08-25 ENCOUNTER — APPOINTMENT (OUTPATIENT)
Dept: PHYSICAL THERAPY | Age: 57
End: 2017-08-25

## 2017-08-29 ENCOUNTER — HOSPITAL ENCOUNTER (OUTPATIENT)
Dept: PHYSICAL THERAPY | Age: 57
End: 2017-08-29

## 2017-09-14 ENCOUNTER — APPOINTMENT (OUTPATIENT)
Dept: PHYSICAL THERAPY | Age: 57
End: 2017-09-14

## 2017-09-15 ENCOUNTER — HOSPITAL ENCOUNTER (OUTPATIENT)
Dept: PHYSICAL THERAPY | Age: 57
Discharge: HOME | End: 2017-09-15
Payer: OTHER GOVERNMENT

## 2017-09-15 PROCEDURE — 97162 PT EVAL MOD COMPLEX 30 MIN: CPT

## 2017-09-15 NOTE — PROGRESS NOTES
PT DAILY TREATMENT NOTE     Patient Name: Esther Harkins  Date:9/15/2017  : 1960  [x]  Patient  Verified  Payor:  / Plan: [facility]  RETIREES AND DEPENDENTS / Product Type: [facility] /    In time:2:30  Out time:03:15  Total Treatment Time (min): 452  Visit #: 1 of 12    Treatment Area: Midline thoracic back pain [M54.6]    SUBJECTIVE  Pain Level (0-10 scale): 1/10  Any medication changes, allergies to medications, adverse drug reactions, diagnosis change, or new procedure performed?: [x] No    [] Yes (see summary sheet for update)  Subjective functional status/changes:   [] No changes reported  SEE POC    OBJECTIVE   Posture:  Lateral Shift: [] R    [] L     [] +  [] -  Kyphosis: [] Increased [] Decreased   []  WNL  Lordosis:  [] Increased [] Decreased   [] WNL  Pelvic symmetry: [] WNL    [] Other:    Gait:  [] Normal     [] Abnormal:    Active Movements: [] N/A   [] Too acute   [] Other: WNL however pain with end range of right SB on left side of lumbar region  ROM % AROM % PROM Comments:pain, area   Forward flexion 40-60      Extension 20-30      SB right 20-30      SB left 20-30      Rotation right 5-10      Rotation left 5-10        Repeated Movements   Effects on present pain: produces (AR), abolishes (A), increases (incr), decreases (decr), centralizes (C), peripheral (PH), no effect (NE)   Pre-Test Sx Flexion Repeated Flexion Extension Repeated Extension Repeated SBL Repeated SBR   Sitting          Standing     NE     Lying      N/A N/A   Comments:  Side Glide:  Sustained passive positioning test:    Neuro Screen [] WNL  Myotome/Dermatome/Reflexes: intact sensation with crude touch bilaterally L3-S1  Comments:    Dural Mobility:  SLR Sitting: [] R    [] L    [] +    [] -  @ (degrees):           Supine: [] R    [] L    [] +    [x] -  @ (degrees):   Slump Test: [] R    [] L    [] +    [] -  @ (degrees):   Prone Knee Bend: [] R    [] L    [] +    [] -     Palpation  [] Min  [] Mod  [] Severe    Location: no TTP along paraspinals or mid of lumbar region and gluteals area when assessed per patient  [] Min  [] Mod  [] Severe    Location:  [] Min  [] Mod  [] Severe    Location:    Strength   L(0-5) R (0-5) N/T   Hip Flexion (L1,2)   []   Knee Extension (L3,4) 5 5 []   Ankle Dorsiflexion (L4)   []   Great Toe Extension (L5)   []   Ankle Plantarflexion (S1)   []   Knee Flexion (S1,2) 5 5 []   Upper Abdominals   []   Lower Abdominals   []   Paraspinals   []   Back Rotators   []   Gluteus Curtis 5 5 []   Other Hip Add 4 4- []   Hip ABD: left 5, right 3+  Special Tests  Global Muscular Weakness:  Abdominals:  Quadratus Lumborum:  Paraspinals: 3/5  Other: Scapular stabilizers grossly 4-/5    Other tests/comments:    Modality rationale: decrease pain and increase tissue extensibility to improve the patients ability to maneuver back    Min Type Additional Details    [] Estim:  []Unatt       []IFC  []Premod                        []Other:  []w/ice   []w/heat  Position:  Location:    [] Estim: []Att    []TENS instruct  []NMES                    []Other:  []w/US   []w/ice   []w/heat  Position:  Location:    []  Traction: [] Cervical       []Lumbar                       [] Prone          []Supine                       []Intermittent   []Continuous Lbs:  [] before manual  [] after manual    []  Ultrasound: []Continuous   [] Pulsed                           []1MHz   []3MHz W/cm2:  Location:    []  Iontophoresis with dexamethasone         Location: [] Take home patch   [] In clinic   10 [x]  Ice     []  heat  []  Ice massage  []  Laser   []  Anodyne Position:supine  Location: lumbar    []  Laser with stim  []  Other:  Position:  Location:    []  Vasopneumatic Device Pressure:       [] lo [] med [] hi   Temperature: [] lo [] med [] hi   [] Skin assessment post-treatment:  []intact []redness- no adverse reaction    []redness  adverse reaction:     35 min [x]Eval                  []Re-Eval     Other Objective/Functional Measures: FOTO: 49     Pain Level (0-10 scale) post treatment: 0/10    ASSESSMENT/Changes in Function:   Patient 56year old female who presents with mechanical mid and low back pain. Patient reports pain is chronic however has worsened in the past 6 months with pain in mid thoracic and lumbar region and down into left thigh region with some tingling/burning pain. MRIs performed about 2 years ago which indicated DDD and herniated disc but unable to recall which levels specifically. Patient has addressed with cortisone injection about one week ago which has improved pain but not tingling/burning pain. Activities which make it worse are prolonged standing or extension activities. Examination demonstrated trunk AROM WNL however pain with left SB, limited hip strength R LE, decreased strength in scapular and paraspinal stabilizers, and increased pain with extension activities. Patient would benefit from skilled PT services to address deficits and improve patient's functional mobility. []  See Plan of Care  []  See progress note/recertification  []  See Discharge Summary         Progress towards goals / Updated goals:  SEE POC    PLAN  []  Upgrade activities as tolerated     [x]  Continue plan of care  []  Update interventions per flow sheet       []  Discharge due to:_  []  Other:_      Toni Bacon 9/15/2017  2:31 PM    No future appointments.

## 2017-09-15 NOTE — PROGRESS NOTES
[facility] at [address] [place] [address]  Phone: [phone]   Fax: [phone]    Plan of Care/ Statement of Necessity for Physical Therapy Services     Patient name: Christine Chang Start of Care: 9/15/2017   Referral source: Sanam Vick MD : 1960    Medical Diagnosis: Midline thoracic back pain [M54.6]   Onset Date: 6 months    Treatment Diagnosis: mid and low back pain    Prior Hospitalization: see medical history Provider#: [id number]   Medications: Verified on Patient summary List    Comorbidities: nerve ablation performed in cervical and lumbar region within the past two years,    Prior Level of Function: patient had to modify some activities secondary to pain prior to exacerbation however now worse     The Plan of Care and following information is based on the information from the initial evaluation. Assessment/ key information:   Patient 56year old female who presents with mechanical mid and low back pain. Patient reports pain is chronic however has worsened in the past 6 months with pain in mid thoracic and lumbar region and down into left thigh region with some tingling/burning pain. MRIs performed about 2 years ago which indicated DDD and herniated disc but unable to recall which levels specifically. Patient has addressed with cortisone injection about one week ago which has improved pain but not tingling/burning pain. Activities which make it worse are prolonged standing or extension activities. Examination demonstrated trunk AROM WNL however pain with left SB, limited hip strength R LE, decreased strength in scapular and paraspinal stabilizers, and increased pain with extension activities. Patient would benefit from skilled PT services to address deficits and improve patient's functional mobility.    Evaluation Complexity History HIGH Complexity :3+ comorbidities / personal factors will impact the outcome/ POC ; Examination MEDIUM Complexity : 3 Standardized tests and measures addressing body structure, function, activity limitation and / or participation in recreation  ;Presentation MEDIUM Complexity : Evolving with changing characteristics  ; Clinical Decision Making MEDIUM Complexity : FOTO score of 26-74  Overall Complexity Rating: MEDIUM  Problem List: pain affecting function, decrease ROM, decrease strength, edema affecting function, impaired gait/ balance, decrease ADL/ functional abilitiies, decrease activity tolerance, decrease flexibility/ joint mobility and decrease transfer abilities   Treatment Plan may include any combination of the following: Therapeutic exercise, Therapeutic activities, Neuromuscular re-education, Physical agent/modality, Gait/balance training, Manual therapy and Patient education  Patient / Family readiness to learn indicated by: asking questions, trying to perform skills and interest  Persons(s) to be included in education: patient (P)  Barriers to Learning/Limitations: None  Patient Goal (s): alleviate pain, numbness  Patient Self Reported Health Status: good  Rehabilitation Potential: good    Short Term Goals: To be accomplished in 2 weeks:  1. Patient will be compliant with HEP as PT progresses. Status @ Eval: to be initiated 2nd visit  2. Patient will have decreased pain to 4/10 at most with activities to be able to tolerate prolonged walking of 30 minutes. Status @ Eval: 10/10 at worst    Long Term Goals: To be accomplished in 4 weeks:  1. Patient will be compliant with HEP as PT progresses. Status @ Eval: to be initiated 2nd visit  2. Patient will have decreased pain to 2/10 at most with activities to be able to tolerate prolonged walking of 30 minutes. Status @ Eval: 10/10 at worst  3. Patient will have increased hip strength to 4/5 at least with activities to be able to maneuver self in and out of bed.   Status @ Eval:    Strength    L(0-5) R (0-5) N/T   Hip Flexion (L1,2)     []   Knee Extension (L3,4) 5 5 []   Ankle Dorsiflexion (L4)     []   Great Toe Extension (L5)     []   Ankle Plantarflexion (S1)     []   Knee Flexion (S1,2) 5 5 []   Upper Abdominals     []   Lower Abdominals     []   Paraspinals     []   Back Rotators     []   Gluteus Curtis 5 5 []   Other Hip Add 4 4- []   Hip ABD: left 5, right 3+  4. Patient will have improved FOTO score of 7 points indicating improved overall functional mobility. Status @ Eval: 49     Frequency / Duration: Patient to be seen 2-3 times per week for 4 weeks. Patient/ Caregiver education and instruction: Diagnosis, prognosis, other POC   [x]  Plan of care has been reviewed with MIKEY Ch 9/15/2017 2:31 PM  _____________________________________________________________________  I certify that the above Therapy Services are being furnished while the patient is under my care. I agree with the treatment plan and certify that this therapy is necessary.     Physician's Signature:____________________  Date:__________Time:______    Please sign and return to [facility] at [address] [place] [address]  Phone: [phone]   Fax: [phone]

## 2017-09-20 ENCOUNTER — HOSPITAL ENCOUNTER (OUTPATIENT)
Dept: PHYSICAL THERAPY | Age: 57
Discharge: HOME | End: 2017-09-20
Payer: OTHER GOVERNMENT

## 2017-09-20 PROCEDURE — 97110 THERAPEUTIC EXERCISES: CPT

## 2017-09-20 PROCEDURE — 97112 NEUROMUSCULAR REEDUCATION: CPT

## 2017-09-20 NOTE — PROGRESS NOTES
PT DAILY TREATMENT NOTE     Patient Name: Jared Pedersen  Date:2017  : 1960  [x]  Patient  Verified  Payor:  / Plan: [facility]  RETIREES AND DEPENDENTS / Product Type: Eren Putty /    In time:600  Out time:645  Total Treatment Time (min): 45  Visit #: 2 of 12    Treatment Area: Midline thoracic back pain [M54.6]    SUBJECTIVE  Pain Level (0-10 scale): 1  Any medication changes, allergies to medications, adverse drug reactions, diagnosis change, or new procedure performed?: [x] No    [] Yes (see summary sheet for update)  Subjective functional status/changes:   [] No changes reported  \"I don't know what's going on. \"    OBJECTIVE    Modality rationale: decrease inflammation and decrease pain to improve the patients ability to perform ADL's with reduced pain levels.     Min Type Additional Details    [] Estim:  []Unatt       []IFC  []Premod                        []Other:  []w/ice   []w/heat  Position:  Location:    [] Estim: []Att    []TENS instruct  []NMES                    []Other:  []w/US   []w/ice   []w/heat  Position:  Location:    []  Traction: [] Cervical       []Lumbar                       [] Prone          []Supine                       []Intermittent   []Continuous Lbs:  [] before manual  [] after manual    []  Ultrasound: []Continuous   [] Pulsed                           []1MHz   []3MHz W/cm2:  Location:    []  Iontophoresis with dexamethasone         Location: [] Take home patch   [] In clinic    []  Ice     []  heat  []  Ice massage  []  Laser   []  Anodyne Position:  Location:    []  Laser with stim  []  Other:  Position:  Location:   10 [x]  Vasopneumatic Device Pressure:       [] lo [] med [] hi   Temperature: [] lo [] med [] hi   [x] Skin assessment post-treatment:  [x]intact []redness- no adverse reaction    []redness  adverse reaction:     25 min Therapeutic Exercise:  [x] See flow sheet :   Rationale: increase ROM, increase strength and improve coordination to improve the patients ability to perform ADL's with reduced pain levels. 10 min Neuromuscular Re-education:  [x]  See flow sheet :   Rationale: increase ROM, increase strength, improve coordination, improve balance and increase proprioception  to improve the patients ability to perform ADL's with reduced pain levels. With   [] TE   [] TA   [] neuro   [] other: Patient Education: [x] Review HEP    [] Progressed/Changed HEP based on:   [] positioning   [] body mechanics   [] transfers   [] heat/ice application    [] other:      Other Objective/Functional Measures:      Pain Level (0-10 scale) post treatment: 1    ASSESSMENT/Changes in Function: Pt tolerated session well. Required cuing to perform exercises in pain-free ranges. More difficult with LTR to left than right but able to perform in smaller range. Patient will continue to benefit from skilled PT services to modify and progress therapeutic interventions, address functional mobility deficits, address ROM deficits, address strength deficits, analyze and address soft tissue restrictions, analyze and cue movement patterns, analyze and modify body mechanics/ergonomics, assess and modify postural abnormalities and instruct in home and community integration to attain remaining goals. []  See Plan of Care  []  See progress note/recertification  []  See Discharge Summary         Progress towards goals / Updated goals:  Short Term Goals: To be accomplished in 2 weeks:  1. Patient will be compliant with HEP as PT progresses. Status @ Eval: to be initiated 2nd visit  Current: dispensed and reviewed  2. Patient will have decreased pain to 4/10 at most with activities to be able to tolerate prolonged walking of 30 minutes. Status @ Eval: 10/10 at worst     Long Term Goals: To be accomplished in 4 weeks:  1. Patient will be compliant with HEP as PT progresses. Status @ Eval: to be initiated 2nd visit  2.  Patient will have decreased pain to 2/10 at most with activities to be able to tolerate prolonged walking of 30 minutes. Status @ Eval: 10/10 at worst  3. Patient will have increased hip strength to 4/5 at least with activities to be able to maneuver self in and out of bed. Status @ Eval:    Strength     L(0-5) R (0-5) N/T   Hip Flexion (L1,2)       []   Knee Extension (L3,4) 5 5 []   Ankle Dorsiflexion (L4)       []   Great Toe Extension (L5)       []   Ankle Plantarflexion (S1)       []   Knee Flexion (S1,2) 5 5 []   Upper Abdominals       []   Lower Abdominals       []   Paraspinals       []   Back Rotators       []   Gluteus Curtis 5 5 []   Other Hip Add 4 4- []   Hip ABD: left 5, right 3+  4. Patient will have improved FOTO score of 7 points indicating improved overall functional mobility.   Status @ Eval: 52      PLAN  []  Upgrade activities as tolerated     [x]  Continue plan of care  []  Update interventions per flow sheet       []  Discharge due to:_  []  Other:_      Yosef Jackson PT 9/20/2017  6:01 PM    Future Appointments  Date Time Provider Rickey Tom   9/21/2017 11:00 AM [address] [place] [address] THE [facility]   9/22/2017 11:00 AM FARA Rodriguez THE [facility]   9/25/2017 3:30 PM FARA Bryant THE [facility]   9/26/2017 11:00 AM FARA Moore THE [facility]   10/2/2017 4:30 PM FARA Rodriguez THE [facility]   10/4/2017 4:30 PM FARA Rodriguez [facility]   10/5/2017 4:00 PM FARA Rodriguez [facility]   10/9/2017 9:30 AM FARA Moore THE [facility]   10/10/2017 6:00 PM FARA Moore THE [facility]   10/12/2017 4:30 PM Yosef Jackson, PT [facility]   10/16/2017 4:30 PM Yosef Jackson, PT [facility]   10/18/2017 4:30 PM Yosef Jackson, PT [facility]   10/19/2017 4:00 PM Yosef Jackson, PT [facility]

## 2017-09-21 ENCOUNTER — APPOINTMENT (OUTPATIENT)
Dept: PHYSICAL THERAPY | Age: 57
End: 2017-09-21
Payer: OTHER GOVERNMENT

## 2017-09-22 ENCOUNTER — HOSPITAL ENCOUNTER (OUTPATIENT)
Dept: PHYSICAL THERAPY | Age: 57
End: 2017-09-22
Payer: OTHER GOVERNMENT

## 2017-09-25 ENCOUNTER — HOSPITAL ENCOUNTER (OUTPATIENT)
Dept: PHYSICAL THERAPY | Age: 57
Discharge: HOME | End: 2017-09-25
Payer: OTHER GOVERNMENT

## 2017-09-25 PROCEDURE — 97112 NEUROMUSCULAR REEDUCATION: CPT

## 2017-09-25 PROCEDURE — 97110 THERAPEUTIC EXERCISES: CPT

## 2017-09-25 NOTE — PROGRESS NOTES
PT DAILY TREATMENT NOTE 12    Patient Name: Toshia Brooks  Date:2017  : 1960  [x]  Patient  Verified  Payor:  / Plan: [facility]  RETIREES AND DEPENDENTS / Product Type:  /    In time:335  Out time:420  Total Treatment Time (min): 45  Visit #: 3 of 12    Treatment Area: Midline thoracic back pain [M54.6]    SUBJECTIVE  Pain Level (0-10 scale): 0/10  Any medication changes, allergies to medications, adverse drug reactions, diagnosis change, or new procedure performed?: [x] No    [] Yes (see summary sheet for update)  Subjective functional status/changes:   [] No changes reported  I will be getting scheduled for an MRI.     OBJECTIVE    Modality rationale: decrease pain and increase tissue extensibility to improve the patients ability to increase activity/position tolerance   Min Type Additional Details    [] Estim:  []Unatt       []IFC  []Premod                        []Other:  []w/ice   []w/heat  Position:  Location:    [] Estim: []Att    []TENS instruct  []NMES                    []Other:  []w/US   []w/ice   []w/heat  Position:  Location:    []  Traction: [] Cervical       []Lumbar                       [] Prone          []Supine                       []Intermittent   []Continuous Lbs:  [] before manual  [] after manual    []  Ultrasound: []Continuous   [] Pulsed                           []1MHz   []3MHz W/cm2:  Location:    []  Iontophoresis with dexamethasone         Location: [] Take home patch   [] In clinic   10 []  Ice     [x]  heat  []  Ice massage  []  Laser   []  Anodyne Position: H/L supine  Location:left hip region    []  Laser with stim  []  Other:  Position:  Location:    []  Vasopneumatic Device Pressure:       [] lo [] med [] hi   Temperature: [] lo [] med [] hi   [] Skin assessment post-treatment:  []intact []redness- no adverse reaction    []redness  adverse reaction:      min []Eval                  []Re-Eval       25 min Therapeutic Exercise:  [x] See flow sheet :   Rationale: increase ROM and increase strength to improve the patients ability to normalize ADL's     min Therapeutic Activity:  []  See flow sheet :        10 min Neuromuscular Re-education:  []  See flow sheet :   Rationale: increase strength and increase proprioception  to improve the patients ability to normalize function     min Manual Therapy:          min Gait Training:  ___ feet with ___ device on level surfaces with ___ level of assist   Rationale: With   [] TE   [] TA   [] neuro   [] other: Patient Education: [x] Review HEP    [] Progressed/Changed HEP based on:   [] positioning   [] body mechanics   [] transfers   [] heat/ice application    [] other:      Other Objective/Functional Measures: none taken today     Pain Level (0-10 scale) post treatment: 2/10    ASSESSMENT/Changes in Function: No new progress. Patient will continue to benefit from skilled PT services to modify and progress therapeutic interventions, address ROM deficits, address strength deficits, analyze and address soft tissue restrictions, analyze and cue movement patterns, analyze and modify body mechanics/ergonomics and assess and modify postural abnormalities to attain remaining goals. []  See Plan of Care  []  See progress note/recertification  []  See Discharge Summary         Progress towards goals / Updated goals:  Short Term Goals: To be accomplished in 2 weeks:  1. Patient will be compliant with HEP as PT progresses. Status @ Eval: to be initiated 2nd visit  Current: dispensed and reviewed  2. Patient will have decreased pain to 4/10 at most with activities to be able to tolerate prolonged walking of 30 minutes. Status @ Eval: 10/10 at [address] be accomplished in 4 weeks:  1. Patient will be compliant with HEP as PT progresses. Status @ Eval: to be initiated 2nd visit  2.  Patient will have decreased pain to 2/10 at most with activities to be able to tolerate prolonged walking of 30 minutes. Status @ Eval: 10/10 at worst  3. Patient will have increased hip strength to 4/5 at least with activities to be able to maneuver self in and out of bed. Status @ Eval:    Strength     L(0-5) R (0-5) N/T   Hip Flexion (L1,2)       []   Knee Extension (L3,4) 5 5 []   Ankle Dorsiflexion (L4)       []   Great Toe Extension (L5)       []   Ankle Plantarflexion (S1)       []   Knee Flexion (S1,2) 5 5 []   Upper Abdominals       []   Lower Abdominals       []   Paraspinals       []   Back Rotators       []   Gluteus Curtis 5 5 []   Other Hip Add 4 4- []   Hip ABD: left 5, right 3+  4. Patient will have improved FOTO score of 7 points indicating improved overall functional mobility.   Status @ Eval: 52         PLAN  [x]  Upgrade activities as tolerated     [x]  Continue plan of care  []  Update interventions per flow sheet       []  Discharge due to:_  []  Other:_      Ja Jacobs, PT 9/25/2017  3:35 PM    Future Appointments  Date Time Provider Rickey Tom   9/26/2017 11:00 AM Ja Jacobs, PT [facility]   10/2/2017 4:30 PM Quynh Abarca, PT [facility]   10/4/2017 4:30 PM [address] [place] [address] [facility]   10/5/2017 4:00 PM [address] [place] [address] [facility]   10/9/2017 9:30 AM Ja Jacobs, PT [facility]   10/10/2017 6:00 PM Ja Jacobs, PT [facility]   10/12/2017 4:30 PM Quynh Abarca, PT [facility]   10/16/2017 4:30 PM Cherylin Lennox Lorrane Bergamo, PT [facility]   10/18/2017 4:30 PM Cherfranklin Lewisnox Faiza Hanson, PT [facility]   10/19/2017 4:00 PM Quynh Abarca PT [facility] THE [facility]

## 2017-09-26 ENCOUNTER — HOSPITAL ENCOUNTER (OUTPATIENT)
Dept: PHYSICAL THERAPY | Age: 57
Discharge: HOME | End: 2017-09-26
Payer: OTHER GOVERNMENT

## 2017-09-26 PROCEDURE — 97112 NEUROMUSCULAR REEDUCATION: CPT

## 2017-09-26 PROCEDURE — 97110 THERAPEUTIC EXERCISES: CPT

## 2017-09-26 NOTE — PROGRESS NOTES
PT DAILY TREATMENT NOTE     Patient Name: Mk Bobby  Date:2017  : 1960  [x]  Patient  Verified  Payor:  / Plan: [facility]  RETIREES AND DEPENDENTS / Product Type:  /    In time:1130  Out time:1218  Total Treatment Time (min): 48  Visit #: 4 of 12    Treatment Area: Midline thoracic back pain [M54.6]    SUBJECTIVE  Pain Level (0-10 scale): 3/10  Any medication changes, allergies to medications, adverse drug reactions, diagnosis change, or new procedure performed?: [x] No    [] Yes (see summary sheet for update)  Subjective functional status/changes:   [] No changes reported  Sore from yesterday.     OBJECTIVE    Modality rationale: decrease pain and increase tissue extensibility to improve the patients ability to increase activity/position tolerance   Min Type Additional Details    [] Estim:  []Unatt       []IFC  []Premod                        []Other:  []w/ice   []w/heat  Position:  Location:    [] Estim: []Att    []TENS instruct  []NMES                    []Other:  []w/US   []w/ice   []w/heat  Position:  Location:    []  Traction: [] Cervical       []Lumbar                       [] Prone          []Supine                       []Intermittent   []Continuous Lbs:  [] before manual  [] after manual    []  Ultrasound: []Continuous   [] Pulsed                           []1MHz   []3MHz W/cm2:  Location:    []  Iontophoresis with dexamethasone         Location: [] Take home patch   [] In clinic   10 []  Ice     [x]  heat  []  Ice massage  []  Laser   []  Anodyne Position: H/L supine  Location:left hip region    []  Laser with stim  []  Other:  Position:  Location:    []  Vasopneumatic Device Pressure:       [] lo [] med [] hi   Temperature: [] lo [] med [] hi   [] Skin assessment post-treatment:  []intact []redness- no adverse reaction    []redness  adverse reaction:      min []Eval                  []Re-Eval       28 min Therapeutic Exercise:  [x] See flow sheet : Rationale: increase ROM and increase strength to improve the patients ability to normalize ADL's     min Therapeutic Activity:  []  See flow sheet :        10 min Neuromuscular Re-education:  []  See flow sheet :   Rationale: increase strength and increase proprioception  to improve the patients ability to normalize function     min Manual Therapy:          min Gait Training:  ___ feet with ___ device on level surfaces with ___ level of assist   Rationale: With   [] TE   [] TA   [] neuro   [] other: Patient Education: [x] Review HEP    [] Progressed/Changed HEP based on:   [] positioning   [] body mechanics   [] transfers   [] heat/ice application    [] other:      Other Objective/Functional Measures: see goal review     Pain Level (0-10 scale) post treatment: 2/10    ASSESSMENT/Changes in Function: Pain intensity decreasing from [facility]. Patient will continue to benefit from skilled PT services to modify and progress therapeutic interventions, address ROM deficits, address strength deficits, analyze and address soft tissue restrictions, analyze and cue movement patterns, analyze and modify body mechanics/ergonomics and assess and modify postural abnormalities to attain remaining goals. []  See Plan of Care  []  See progress note/recertification  []  See Discharge Summary         Progress towards goals / Updated goals:  Short Term Goals: To be accomplished in 2 weeks:  1. Patient will be compliant with HEP as PT progresses. Status @ Eval: to be initiated 2nd visit  Current: dispensed and reviewed, updated on 4th visit  2. Patient will have decreased pain to 4/10 at most with activities to be able to tolerate prolonged walking of 30 minutes. Status @ Eval: 10/10 at worst  Current Status: 5/10 at [address] be accomplished in 4 weeks:  1. Patient will be compliant with HEP as PT progresses. Status @ Eval: to be initiated 2nd visit  Current Status: progressing  2.  Patient will have decreased pain to 2/10 at most with activities to be able to tolerate prolonged walking of 30 minutes. Status @ Eval: 10/10 at worst  Current Status: 5/10 at worst  3. Patient will have increased hip strength to 4/5 at least with activities to be able to maneuver self in and out of bed. Status @ Eval:    Strength     L(0-5) R (0-5) N/T   Hip Flexion (L1,2)       []   Knee Extension (L3,4) 5 5 []   Ankle Dorsiflexion (L4)       []   Great Toe Extension (L5)       []   Ankle Plantarflexion (S1)       []   Knee Flexion (S1,2) 5 5 []   Upper Abdominals       []   Lower Abdominals       []   Paraspinals       []   Back Rotators       []   Gluteus Curtis 5 5 []   Other Hip Add 4 4- []   Hip ABD: left 5, right 3+  Current Status: no change    4. Patient will have improved FOTO score of 7 points indicating improved overall functional mobility.   Status @ Eval: 49  Current Status: retest visit 5           PLAN  [x]  Upgrade activities as tolerated     [x]  Continue plan of care  []  Update interventions per flow sheet       []  Discharge due to:_  []  Other:_      Tico Child, PT 9/26/2017  11:35 AM    Future Appointments  Date Time Provider iRckey Tom   10/2/2017 4:30 PM [address] [facility]   10/4/2017 4:30 PM [address]   10/5/2017 4:00 PM [address], [place] [facility]   10/9/2017 9:30 AM Tico Child, [facility]   10/10/2017 6:00 PM Tico Child, PT [facility]   10/12/2017 4:30 PM [address], PT [facility]   10/16/2017 4:30 PM [address], PT [facility]   10/18/2017 4:30 PM [address], [place] [facility]   10/19/2017 4:00 PM [facility] Habermann [facility]

## 2017-09-29 ENCOUNTER — APPOINTMENT (OUTPATIENT)
Dept: PHYSICAL THERAPY | Age: 57
End: 2017-09-29
Payer: OTHER GOVERNMENT

## 2017-10-02 ENCOUNTER — HOSPITAL ENCOUNTER (OUTPATIENT)
Dept: PHYSICAL THERAPY | Age: 57
Discharge: HOME | End: 2017-10-02
Payer: OTHER GOVERNMENT

## 2017-10-02 PROCEDURE — 97110 THERAPEUTIC EXERCISES: CPT

## 2017-10-02 PROCEDURE — 97112 NEUROMUSCULAR REEDUCATION: CPT

## 2017-10-02 NOTE — PROGRESS NOTES
PT DAILY TREATMENT NOTE     Patient Name: Giovanni Hewitt  Date:10/2/2017  : 1960  [x]  Patient  Verified  Payor:  / Plan: [facility]  RETIREES AND DEPENDENTS / Product Type:  /    In time:437  Out time:533  Total Treatment Time (min): 56  Visit #: 5 of 12    Treatment Area: Midline thoracic back pain [M54.6]    SUBJECTIVE  Pain Level (0-10 scale): 3  Any medication changes, allergies to medications, adverse drug reactions, diagnosis change, or new procedure performed?: [x] No    [] Yes (see summary sheet for update)  Subjective functional status/changes:   [] No changes reported  \"The injection helped but I have been having a lot of trouble with that burning. \"    OBJECTIVE    41 min Therapeutic Exercise:  [x] See flow sheet :   Rationale: increase ROM, increase strength, improve coordination, improve balance and increase proprioception to improve the patients ability to perform ADL's with reduced pain levels. 15 min Neuromuscular Re-education:  []  See flow sheet :   Rationale: increase ROM, increase strength, improve coordination, improve balance and increase proprioception  to improve the patients ability to perform ADL's with reduced pain levels. With   [] TE   [] TA   [] neuro   [] other: Patient Education: [x] Review HEP    [] Progressed/Changed HEP based on:   [] positioning   [] body mechanics   [] transfers   [] heat/ice application    [] other:      Other Objective/Functional Measures: NE with REIL or FANNIE     Pain Level (0-10 scale) post treatment: 1    ASSESSMENT/Changes in Function: Pt reports worsening of LE burning lately, but not present today. Advised pt to perform FANNIE or REIL if burning in LE worsens. Reviewed with pt to stop extension exercises with LE symptoms worsen or are produced with exercise.       Patient will continue to benefit from skilled PT services to modify and progress therapeutic interventions, address functional mobility deficits, address ROM deficits, address strength deficits, analyze and address soft tissue restrictions, analyze and cue movement patterns, analyze and modify body mechanics/ergonomics, assess and modify postural abnormalities and instruct in home and community integration to attain remaining goals. []  See Plan of Care  []  See progress note/recertification  []  See Discharge Summary         Progress towards goals / Updated goals:  Short Term Goals: To be accomplished in 2 weeks:  1. Patient will be compliant with HEP as PT progresses. Status @ Eval: to be initiated 2nd visit  Current: dispensed and reviewed, updated on 4th visit  2. Patient will have decreased pain to 4/10 at most with activities to be able to tolerate prolonged walking of 30 minutes. Status @ Eval: 10/10 at worst  Current Status: 5/10 at [address] be accomplished in 4 weeks:  1. Patient will be compliant with HEP as PT progresses. Status @ Eval: to be initiated 2nd visit  Current Status: progressing  2. Patient will have decreased pain to 2/10 at most with activities to be able to tolerate prolonged walking of 30 minutes. Status @ Eval: 10/10 at worst  Current Status: 5/10 at worst  3. Patient will have increased hip strength to 4/5 at least with activities to be able to maneuver self in and out of bed. Status @ Eval:    Strength     L(0-5) R (0-5) N/T   Hip Flexion (L1,2)       []   Knee Extension (L3,4) 5 5 []   Ankle Dorsiflexion (L4)       []   Great Toe Extension (L5)       []   Ankle Plantarflexion (S1)       []   Knee Flexion (S1,2) 5 5 []   Upper Abdominals       []   Lower Abdominals       []   Paraspinals       []   Back Rotators       []   Gluteus Curtis 5 5 []   Other Hip Add 4 4- []   Hip ABD: left 5, right 3+  Current Status: no change     4. Patient will have improved FOTO score of 7 points indicating improved overall functional mobility.   Status @ Eval: 49  Current Status: 54-- 5 point improvement, progressing      PLAN  []  Upgrade activities as tolerated     [x]  Continue plan of care  []  Update interventions per flow sheet       []  Discharge due to:_  []  Other:_      Nataly Boland, FARA 10/2/2017  4:47 PM    Future Appointments  Date Time Provider Rickey Tom   10/4/2017 4:30 PM Latasha Bhatia [facility]   10/5/2017 4:00 PM Latasha Bhatia [facility]   10/9/2017 9:30 AM [id number] [facility]   10/10/2017 6:00 PM Latasha Burdick [facility]   10/12/2017 4:30 PM Nataly Boland, PT [facility]   10/16/2017 4:30 PM Nataly Boland, PT [place] [facility]   10/18/2017 4:30 PM Nataly Boland, PT [place] [facility]   10/19/2017 4:00 PM Nataly Boland, PT [place] [facility]

## 2017-10-04 ENCOUNTER — HOSPITAL ENCOUNTER (OUTPATIENT)
Dept: PHYSICAL THERAPY | Age: 57
Discharge: HOME | End: 2017-10-04
Payer: OTHER GOVERNMENT

## 2017-10-04 PROCEDURE — 97110 THERAPEUTIC EXERCISES: CPT

## 2017-10-04 PROCEDURE — 97112 NEUROMUSCULAR REEDUCATION: CPT

## 2017-10-04 NOTE — PROGRESS NOTES
PT DAILY TREATMENT NOTE 12    Patient Name: Tao Duncan  Date:10/4/2017  : 1960  [x]  Patient  Verified  Payor:  / Plan: [facility]  RETIREES AND DEPENDENTS / Product Type:  /    In time:433  Out time:521  Total Treatment Time (min): 48  Visit #: 6 of 12    Treatment Area: Midline thoracic back pain [M54.6]    SUBJECTIVE  Pain Level (0-10 scale): 3  Any medication changes, allergies to medications, adverse drug reactions, diagnosis change, or new procedure performed?: [x] No    [] Yes (see summary sheet for update)  Subjective functional status/changes:   [] No changes reported  \"I tried to do some of the exercises earlier and I'm a little sore. \":    OBJECTIVE    Modality rationale: decrease pain and increase tissue extensibility to improve the patients ability to perform ADL's with reduced pain levels.     Min Type Additional Details    [] Estim:  []Unatt       []IFC  []Premod                        []Other:  []w/ice   []w/heat  Position:  Location:    [] Estim: []Att    []TENS instruct  []NMES                    []Other:  []w/US   []w/ice   []w/heat  Position:  Location:    []  Traction: [] Cervical       []Lumbar                       [] Prone          []Supine                       []Intermittent   []Continuous Lbs:  [] before manual  [] after manual    []  Ultrasound: []Continuous   [] Pulsed                           []1MHz   []3MHz W/cm2:  Location:    []  Iontophoresis with dexamethasone         Location: [] Take home patch   [] In clinic   10 []  Ice     [x]  heat  []  Ice massage  []  Laser   []  Anodyne Position: supine  Location:l-spine    []  Laser with stim  []  Other:  Position:  Location:    []  Vasopneumatic Device Pressure:       [] lo [] med [] hi   Temperature: [] lo [] med [] hi   [x] Skin assessment post-treatment:  [x]intact [x]redness- no adverse reaction    []redness - adverse reaction:     24 min Therapeutic Exercise:  [x] See flow sheet :   Rationale: increase ROM, increase strength, improve coordination, improve balance and increase proprioception to improve the patients ability to perform ADL's with reduced pain levels.    14 min Neuromuscular Re-education:  []  See flow sheet :   Rationale: increase ROM, increase strength, improve coordination, improve balance and increase proprioception  to improve the patients ability to perform ADL's with reduced pain levels. With   [] TE   [] TA   [] neuro   [] other: Patient Education: [x] Review HEP    [] Progressed/Changed HEP based on:   [] positioning   [] body mechanics   [] transfers   [] heat/ice application    [] other:      Other Objective/Functional Measures:      Pain Level (0-10 scale) post treatment: 0    ASSESSMENT/Changes in Function: Pt tolerated session well, cued on form of PPT and bridge and updated HEP. Consider adding row and ext. Patient will continue to benefit from skilled PT services to modify and progress therapeutic interventions, address functional mobility deficits, address ROM deficits, address strength deficits, analyze and address soft tissue restrictions, analyze and cue movement patterns, analyze and modify body mechanics/ergonomics, assess and modify postural abnormalities and instruct in home and community integration to attain remaining goals. []  See Plan of Care  []  See progress note/recertification  []  See Discharge Summary         Progress towards goals / Updated goals:  Short Term Goals: To be accomplished in 2 weeks:  1. Patient will be compliant with HEP as PT progresses. Status @ Eval: to be initiated 2nd visit  Current: dispensed and reviewed, updated on 4th visit  2. Patient will have decreased pain to 4/10 at most with activities to be able to tolerate prolonged walking of 30 minutes. Status @ Eval: 10/10 at worst  Current Status: 5/10 at [address] be accomplished in 4 weeks:  1.  Patient will be compliant with HEP as PT progresses. Status @ Eval: to be initiated 2nd visit  Current Status: progressing  2. Patient will have decreased pain to 2/10 at most with activities to be able to tolerate prolonged walking of 30 minutes. Status @ Eval: 10/10 at worst  Current Status: 5/10 at worst  3. Patient will have increased hip strength to 4/5 at least with activities to be able to maneuver self in and out of bed. Status @ Eval:    Strength     L(0-5) R (0-5) N/T   Hip Flexion (L1,2)       []   Knee Extension (L3,4) 5 5 []   Ankle Dorsiflexion (L4)       []   Great Toe Extension (L5)       []   Ankle Plantarflexion (S1)       []   Knee Flexion (S1,2) 5 5 []   Upper Abdominals       []   Lower Abdominals       []   Paraspinals       []   Back Rotators       []   Gluteus Curtis 5 5 []   Other Hip Add 4 4- []   Hip ABD: left 5, right 3+  Current Status: no change      4. Patient will have improved FOTO score of 7 points indicating improved overall functional mobility.   Status @ Eval: 49  Current Status: 54-- 5 point improvement, progressing    PLAN  []  Upgrade activities as tolerated     [x]  Continue plan of care  []  Update interventions per flow sheet       []  Discharge due to:_  []  Other:_      Monika Loja PT 10/4/2017  4:41 PM    Future Appointments  Date Time Provider Rickey Tom   10/5/2017 4:00 PM Latasha Keene [facility]   10/9/2017 9:30 AM Latasha Clemens [facility]   10/10/2017 6:00 PM Latasha Clemens [facility]   10/12/2017 4:30 PM FARA Keene [facility]   10/16/2017 4:30 PM FARA Keene [facility]   10/18/2017 4:30 PM FARA Keene [facility]   10/19/2017 4:00 PM FARA Keene [facility]

## 2017-10-05 ENCOUNTER — HOSPITAL ENCOUNTER (OUTPATIENT)
Dept: PHYSICAL THERAPY | Age: 57
End: 2017-10-05
Payer: OTHER GOVERNMENT

## 2017-10-06 ENCOUNTER — HOSPITAL ENCOUNTER (OUTPATIENT)
Dept: PHYSICAL THERAPY | Age: 57
Discharge: HOME | End: 2017-10-06
Payer: OTHER GOVERNMENT

## 2017-10-06 PROCEDURE — 97110 THERAPEUTIC EXERCISES: CPT

## 2017-10-06 PROCEDURE — 97112 NEUROMUSCULAR REEDUCATION: CPT

## 2017-10-06 NOTE — PROGRESS NOTES
PT DAILY TREATMENT NOTE     Patient Name: Emi Anaya  Date:10/6/2017  : 1960  [x]  Patient  Verified  Payor:  / Plan: [facility]  RETIREES AND DEPENDENTS / Product Type: [employer] /    In time:1102  Out time:1145  Total Treatment Time (min): 43  Visit #: 7 of 12    Treatment Area: Midline thoracic back pain [M54.6]     SUBJECTIVE  Pain Level (0-10 scale): 1/10  Any medication changes, allergies to medications, adverse drug reactions, diagnosis change, or new procedure performed?: [x] No    [] Yes (see summary sheet for update)  Subjective functional status/changes:   [x] No changes reported      OBJECTIVE    Modality rationale: decrease pain and increase tissue extensibility to improve the patients ability to increase activity/position tolerance   Min Type Additional Details    [] Estim:  []Unatt       []IFC  []Premod                        []Other:  []w/ice   []w/heat  Position:  Location:    [] Estim: []Att    []TENS instruct  []NMES                    []Other:  []w/US   []w/ice   []w/heat  Position:  Location:    []  Traction: [] Cervical       []Lumbar                       [] Prone          []Supine                       []Intermittent   []Continuous Lbs:  [] before manual  [] after manual    []  Ultrasound: []Continuous   [] Pulsed                           []1MHz   []3MHz W/cm2:  Location:    []  Iontophoresis with dexamethasone         Location: [] Take home patch   [] In clinic   10 []  Ice     [x]  heat  []  Ice massage  []  Laser   []  Anodyne Position: H/L supine  Location:back    []  Laser with stim  []  Other:  Position:  Location:    []  Vasopneumatic Device Pressure:       [] lo [] med [] hi   Temperature: [] lo [] med [] hi   [] Skin assessment post-treatment:  []intact []redness- no adverse reaction    []redness - adverse reaction:      min []Eval                  []Re-Eval       23 min Therapeutic Exercise:  [x] See flow sheet :   Rationale: increase ROM and increase strength to improve the patients ability to normalize ADL's     min Therapeutic Activity:  []  See flow sheet :        10 min Neuromuscular Re-education:  [x]  See flow sheet :   Rationale: increase strength and increase proprioception  to improve the patients ability to normalize function     min Manual Therapy:        min Gait Training:  ___ feet with ___ device on level surfaces with ___ level of assist   Rationale: With   [] TE   [] TA   [] neuro   [] other: Patient Education: [x] Review HEP    [] Progressed/Changed HEP based on:   [] positioning   [] body mechanics   [] transfers   [] heat/ice application    [] other:      Other Objective/Functional Measures: none taken today      Pain Level (0-10 scale) post treatment: 0/10    ASSESSMENT/Changes in Function: No new progress. Patient will continue to benefit from skilled PT services to modify and progress therapeutic interventions, address ROM deficits, address strength deficits, analyze and address soft tissue restrictions, analyze and cue movement patterns, analyze and modify body mechanics/ergonomics and assess and modify postural abnormalities to attain remaining goals. []  See Plan of Care  []  See progress note/recertification  []  See Discharge Summary         Progress towards goals / Updated goals:  Short Term Goals: To be accomplished in 2 weeks:  1. Patient will be compliant with HEP as PT progresses. Status @ Eval: to be initiated 2nd visit  Current: dispensed and reviewed, updated on 4th visit  2. Patient will have decreased pain to 4/10 at most with activities to be able to tolerate prolonged walking of 30 minutes. Status @ Eval: 10/10 at worst  Current Status: 5/10 at [address] be accomplished in 4 weeks:  1. Patient will be compliant with HEP as PT progresses. Status @ Eval: to be initiated 2nd visit  Current Status: progressing  2.  Patient will have decreased pain to 2/10 at most with activities to be able to tolerate prolonged walking of 30 minutes. Status @ Eval: 10/10 at worst  Current Status: 5/10 at worst  3. Patient will have increased hip strength to 4/5 at least with activities to be able to maneuver self in and out of bed. Status @ Eval:    Strength     L(0-5) R (0-5) N/T   Hip Flexion (L1,2)       []   Knee Extension (L3,4) 5 5 []   Ankle Dorsiflexion (L4)       []   Great Toe Extension (L5)       []   Ankle Plantarflexion (S1)       []   Knee Flexion (S1,2) 5 5 []   Upper Abdominals       []   Lower Abdominals       []   Paraspinals       []   Back Rotators       []   Gluteus Curtis 5 5 []   Other Hip Add 4 4- []   Hip ABD: left 5, right 3+  Current Status: no change      4. Patient will have improved FOTO score of 7 points indicating improved overall functional mobility.   Status @ Eval: 49  Current Status: 54-- 5 point improvement, progressing         PLAN  [x]  Upgrade activities as tolerated     [x]  Continue plan of care  []  Update interventions per flow sheet       []  Discharge due to:_  []  Other:_      Antonia Ashley PT 10/6/2017  11:01 AM    Future Appointments  Date Time Provider Rickey Tom   10/9/2017 9:30 AM [id number] [facility]   10/10/2017 6:00 PM Antonia Ashley PT [facility]   10/12/2017 4:30 PM Owen Hopkins PT [facility]   10/16/2017 4:30 PM Phyllis Gamboa [facility]   10/18/2017 4:30 PM Phyllis Gamboa [place] [facility]   [facility]

## 2017-10-09 ENCOUNTER — HOSPITAL ENCOUNTER (OUTPATIENT)
Dept: PHYSICAL THERAPY | Age: 57
End: 2017-10-09
Payer: OTHER GOVERNMENT

## 2017-10-10 ENCOUNTER — HOSPITAL ENCOUNTER (OUTPATIENT)
Dept: PHYSICAL THERAPY | Age: 57
Discharge: HOME | End: 2017-10-10
Payer: OTHER GOVERNMENT

## 2017-10-10 PROCEDURE — 97112 NEUROMUSCULAR REEDUCATION: CPT

## 2017-10-10 PROCEDURE — 97110 THERAPEUTIC EXERCISES: CPT

## 2017-10-10 NOTE — PROGRESS NOTES
PT DAILY TREATMENT NOTE     Patient Name: Elsie Reason  Date:10/10/2017  : 1960  [x]  Patient  Verified  Payor:  / Plan: [facility]  RETIREES AND DEPENDENTS / Product Type:  /    In time:400  Out time:440  Total Treatment Time (min): 40  Visit #: 8 of 12    Treatment Area: Midline thoracic back pain [M54.6]    SUBJECTIVE  Pain Level (0-10 scale): 2/10  Any medication changes, allergies to medications, adverse drug reactions, diagnosis change, or new procedure performed?: [x] No    [] Yes (see summary sheet for update)  Subjective functional status/changes:   [] No changes reported  I got an MRI last night, but I won't know the results right away.     OBJECTIVE    Modality rationale:    Min Type Additional Details    [] Estim:  []Unatt       []IFC  []Premod                        []Other:  []w/ice   []w/heat  Position:  Location:    [] Estim: []Att    []TENS instruct  []NMES                    []Other:  []w/US   []w/ice   []w/heat  Position:  Location:    []  Traction: [] Cervical       []Lumbar                       [] Prone          []Supine                       []Intermittent   []Continuous Lbs:  [] before manual  [] after manual    []  Ultrasound: []Continuous   [] Pulsed                           []1MHz   []3MHz W/cm2:  Location:    []  Iontophoresis with dexamethasone         Location: [] Take home patch   [] In clinic    []  Ice     []  heat  []  Ice massage  []  Laser   []  Anodyne Position:  Location:    []  Laser with stim  []  Other:  Position:  Location:    []  Vasopneumatic Device Pressure:       [] lo [] med [] hi   Temperature: [] lo [] med [] hi   [] Skin assessment post-treatment:  []intact []redness- no adverse reaction    []redness - adverse reaction:      min []Eval                  []Re-Eval       25 min Therapeutic Exercise:  [x] See flow sheet :   Rationale: increase ROM and increase strength to improve the patients ability to normalize ADL's     min Therapeutic Activity:  []  See flow sheet :        15 min Neuromuscular Re-education:  [x]  See flow sheet :   Rationale: increase strength and increase proprioception  to improve the patients ability to normalize function     min Manual Therapy:          min Gait Training:  ___ feet with ___ device on level surfaces with ___ level of assist   Rationale: With   [] TE   [] TA   [] neuro   [] other: Patient Education: [x] Review HEP    [] Progressed/Changed HEP based on:   [] positioning   [] body mechanics   [] transfers   [] heat/ice application    [] other:      Other Objective/Functional Measures: none taken today     Pain Level (0-10 scale) post treatment: 1/10    ASSESSMENT/Changes in Function: No new progress. Patient will continue to benefit from skilled PT services to modify and progress therapeutic interventions, address ROM deficits, address strength deficits, analyze and address soft tissue restrictions, analyze and cue movement patterns, analyze and modify body mechanics/ergonomics and assess and modify postural abnormalities to attain remaining goals. []  See Plan of Care  []  See progress note/recertification  []  See Discharge Summary         Progress towards goals / Updated goals:  Short Term Goals: To be accomplished in 2 weeks:  1. Patient will be compliant with HEP as PT progresses. Status @ Eval: to be initiated 2nd visit  Current: dispensed and reviewed, updated on 4th visit  2. Patient will have decreased pain to 4/10 at most with activities to be able to tolerate prolonged walking of 30 minutes. Status @ Eval: 10/10 at worst  Current Status: 5/10 at [address] be accomplished in 4 weeks:  1. Patient will be compliant with HEP as PT progresses. Status @ Eval: to be initiated 2nd visit  Current Status: progressing  2. Patient will have decreased pain to 2/10 at most with activities to be able to tolerate prolonged walking of 30 minutes.   Status @ Eval: 10/10 at worst  Current Status: 5/10 at worst  3. Patient will have increased hip strength to 4/5 at least with activities to be able to maneuver self in and out of bed. Status @ Eval:    Strength     L(0-5) R (0-5) N/T   Hip Flexion (L1,2)       []   Knee Extension (L3,4) 5 5 []   Ankle Dorsiflexion (L4)       []   Great Toe Extension (L5)       []   Ankle Plantarflexion (S1)       []   Knee Flexion (S1,2) 5 5 []   Upper Abdominals       []   Lower Abdominals       []   Paraspinals       []   Back Rotators       []   Gluteus Curtis 5 5 []   Other Hip Add 4 4- []   Hip ABD: left 5, right 3+  Current Status: no change      4. Patient will have improved FOTO score of 7 points indicating improved overall functional mobility.   Status @ Eval: 49  Current Status: 54-- 5 point improvement, progressing          PLAN  [x]  Upgrade activities as tolerated     [x]  Continue plan of care  []  Update interventions per flow sheet       []  Discharge due to:_  [x]  Other: PN in next 2 visits if to continue PT: decrease frequency to 2x/wk      Ebonie Goyal PT 10/10/2017  4:13 PM    Future Appointments  Date Time Provider Rickey Tom   10/12/2017 4:30 PM Latasha Car [facility]   10/16/2017 4:30 PM [address] [place] [address]   10/18/2017 4:30 PM [address] [place] [address]   10/19/2017 4:00 PM [address] [place] [address]

## 2017-10-12 ENCOUNTER — HOSPITAL ENCOUNTER (OUTPATIENT)
Dept: PHYSICAL THERAPY | Age: 57
End: 2017-10-12
Payer: OTHER GOVERNMENT

## 2017-10-16 ENCOUNTER — HOSPITAL ENCOUNTER (OUTPATIENT)
Dept: PHYSICAL THERAPY | Age: 57
Discharge: HOME | End: 2017-10-16
Payer: OTHER GOVERNMENT

## 2017-10-16 PROCEDURE — 97530 THERAPEUTIC ACTIVITIES: CPT

## 2017-10-16 PROCEDURE — 97110 THERAPEUTIC EXERCISES: CPT

## 2017-10-16 PROCEDURE — 97112 NEUROMUSCULAR REEDUCATION: CPT

## 2017-10-16 NOTE — PROGRESS NOTES
PT DAILY TREATMENT NOTE     Patient Name: Gigi Bryant  Date:10/16/2017  : 1960  [x]  Patient  Verified  Payor:  / Plan: [facility]  RETIREES AND DEPENDENTS / Product Type:  /    In time:430  Out time:531  Total Treatment Time (min): 61  Visit #: 9 of 12    Treatment Area: Midline thoracic back pain [M54.6]    SUBJECTIVE  Pain Level (0-10 scale): 2  Any medication changes, allergies to medications, adverse drug reactions, diagnosis change, or new procedure performed?: [x] No    [] Yes (see summary sheet for update)  Subjective functional status/changes:   [] No changes reported  \"It is in my shoulder blade. \"    OBJECTIVE    Modality rationale: decrease pain and increase tissue extensibility to improve the patients ability to perform ADL's with reduced pain levels.     Min Type Additional Details    [] Estim:  []Unatt       []IFC  []Premod                        []Other:  []w/ice   []w/heat  Position:  Location:    [] Estim: []Att    []TENS instruct  []NMES                    []Other:  []w/US   []w/ice   []w/heat  Position:  Location:    []  Traction: [] Cervical       []Lumbar                       [] Prone          []Supine                       []Intermittent   []Continuous Lbs:  [] before manual  [] after manual    []  Ultrasound: []Continuous   [] Pulsed                           []1MHz   []3MHz W/cm2:  Location:    []  Iontophoresis with dexamethasone         Location: [] Take home patch   [] In clinic   10 []  Ice     [x]  heat  []  Ice massage  []  Laser   []  Anodyne Position:supine  Location:l-spine    []  Laser with stim  []  Other:  Position:  Location:    []  Vasopneumatic Device Pressure:       [] lo [] med [] hi   Temperature: [] lo [] med [] hi   [] Skin assessment post-treatment:  [x]intact []redness- no adverse reaction    []redness - adverse reaction:     24 min Therapeutic Exercise:  [x] See flow sheet :   Rationale: increase ROM, increase strength and improve coordination to improve the patients ability to perform ADL's with reduced pain levels. 12 min Therapeutic Activity:  []  See flow sheet : discussed resuming short ambulation and stationary biking in gym for aerobic activity, updated goals, discussed prognosis extensively and importance of HEP   Rationale: increase ROM, increase strength, improve coordination, improve balance and increase proprioception  to improve the patients ability to perform ADL's with reduced pain levels. 15 min Neuromuscular Re-education:  [x]  See flow sheet :   Rationale: increase ROM, increase strength, improve coordination, improve balance and increase proprioception  to improve the patients ability to perform ADL's with reduced pain levels. With   [] TE   [] TA   [] neuro   [] other: Patient Education: [x] Review HEP    [] Progressed/Changed HEP based on:   [] positioning   [] body mechanics   [] transfers   [] heat/ice application    [] other:      Other Objective/Functional Measures: see goals     Pain Level (0-10 scale) post treatment: 0    ASSESSMENT/Changes in Function: Pt reports 50% improvements, most specifically with getting out of bed in AM, walking improvement noted. Very weak in hip abd and core. Discussed importance of increasing aerobic activity, performing HEP, and progressing strength. Challenged with rolling and bed mobility in clinic. Patient will continue to benefit from skilled PT services to modify and progress therapeutic interventions, address functional mobility deficits, address ROM deficits, address strength deficits, analyze and address soft tissue restrictions, analyze and cue movement patterns, analyze and modify body mechanics/ergonomics, assess and modify postural abnormalities and instruct in home and community integration to attain remaining goals.      []  See Plan of Care  [x]  See progress note/recertification  []  See Discharge Summary         Progress towards goals / Updated goals:  Short Term Goals: To be accomplished in 2 weeks:  1. Patient will be compliant with HEP as PT progresses. Status @ Eval: to be initiated 2nd visit  Current: reports compliance     2. Patient will have decreased pain to 4/10 at most with activities to be able to tolerate prolonged walking of 30 minutes. Status @ Eval: 10/10 at worst  Current Status: 8/10 at worst-- not met  [address] be accomplished in 4 weeks:  1. Patient will be compliant with HEP as PT progresses. Status @ Eval: to be initiated 2nd visit  Current Status: progressing    2. Patient will have decreased pain to 2/10 at most with activities to be able to tolerate prolonged walking of 30 minutes. Status @ Eval: 10/10 at worst  Current Status: 8/10 at worst-- not met    3. Patient will have increased hip strength to 4/5 at least with activities to be able to maneuver self in and out of bed. Status @ Eval:    Strength     L(0-5) R (0-5) N/T   Knee Extension (L3,4) 5 5 []   Knee Flexion (S1,2) 5 5 []   Gluteus Curtis 5 5 []   Other Hip Add 4 4- []   Hip ABD: left 5, right 3+  Current Status: josefa hip abd 3+/5-- not met      4. Patient will have improved FOTO score of 7 points indicating improved overall functional mobility.   Status @ Eval: 49  Current Status: 54-- 5 point improvement, progressing    PLAN  []  Upgrade activities as tolerated     [x]  Continue plan of care  []  Update interventions per flow sheet       []  Discharge due to:_  []  Other:_      Kuldip Orlando PT 10/16/2017  4:36 PM    Future Appointments  Date Time Provider Rickey Tom   10/18/2017 4:30 PM Latasha Wilkinson [facility]   10/19/2017 4:00 PM Zachery Habermann [facility]

## 2017-10-16 NOTE — PROGRESS NOTES
[facility] at [address] [place] [address]  Phone: [phone]   Fax: [phone]    Progress Note  Patient name: Gus Benítez Start of Care: 9/15/17   Referral source: Todd Murphy MD : 1960   Medical/Treatment Diagnosis: Midline thoracic back pain [M54.6] Onset Date:6 mo     Prior Hospitalization: see medical history Provider#: [id number]   Medications: Verified on Patient Summary List    Comorbidities: nerve ablation performed in cervical and lumbar region within the past two years,    Prior Level of Function: patient had to modify some activities secondary to pain prior to exacerbation however now worse     Visits from Start of Care: 8    Missed Visits: 3    Established Goals:       Short Term Goals: To be accomplished in 2 weeks:  1. Patient will be compliant with HEP as PT progresses. Status @ Eval: to be initiated 2nd visit  Current: reports compliance      2. Patient will have decreased pain to 4/10 at most with activities to be able to tolerate prolonged walking of 30 minutes. Status @ Eval: 10/10 at worst  Current Status: 8/10 at worst-- not met  [address] [place] [address] [address] be accomplished in 4 weeks:  1. Patient will be compliant with HEP as PT progresses. Status @ Eval: to be initiated 2nd visit  Current Status: progressing     2. Patient will have decreased pain to 2/10 at most with activities to be able to tolerate prolonged walking of 30 minutes. Status @ Eval: 10/10 at worst  Current Status: 8/10 at worst-- not met     3. Patient will have increased hip strength to 4/5 at least with activities to be able to maneuver self in and out of bed. Status @ Eval:    Strength     L(0-5) R (0-5) N/T   Knee Extension (L3,4) 5 5 []   Knee Flexion (S1,2) 5 5 []   Gluteus Curtis 5 5 []   Other Hip Add 4 4- []   Hip ABD: left 5, right 3+  Current Status: josefa hip abd 3+/5-- not met      4.  Patient will have improved FOTO score of 7 points indicating improved overall functional mobility. Status @ Eval: 49  Current Status: 54-- 5 point improvement, progressing    Key Functional Changes: Pt has attended 9 PT appointments since IE for LBP. Pt reports 50% improvement in AM pain and walking pain. Continues to report unchanged LE numbness and tingling in left LE. Continues with significant weakness in josefa hip abd. Would continue to benefit from skilled PT to progress HEP and promote independence to prep for d/c.    Updated Goals: to be achieved in 2 weeks:   Cont with unmet goals  ASSESSMENT/RECOMMENDATIONS:  [x]Continue therapy per initial plan/protocol at a frequency of  2 x per week for 2 weeks  []Continue therapy with the following recommended changes:_____________________      _____________________________________________________________________  []Discontinue therapy progressing towards or have reached established goals  []Discontinue therapy due to lack of appreciable progress towards goals  []Discontinue therapy due to lack of attendance or compliance  []Await Physician's recommendations/decisions regarding therapy  []Other:________________________________________________________________    Thank you for this referral.   Melania Washington, PT 10/16/2017 5:23 PM  NOTE TO PHYSICIAN:  PLEASE COMPLETE THE ORDERS BELOW AND   FAX TO [facility]: [phone]) [phone]  If you are unable to process this request in 24 hours please contact our office:   [phone]  []  I have read the above report and request that my patient continue as recommended. []  I have read the above report and request that my patient continue therapy with the following changes/special instructions:________________________________________  []I have read the above report and request that my patient be discharged from therapy.     Physicians signature: ______________________________Date: ______Time:______

## 2017-10-18 ENCOUNTER — HOSPITAL ENCOUNTER (OUTPATIENT)
Dept: PHYSICAL THERAPY | Age: 57
Discharge: HOME | End: 2017-10-18
Payer: OTHER GOVERNMENT

## 2017-10-18 PROCEDURE — 97110 THERAPEUTIC EXERCISES: CPT

## 2017-10-18 PROCEDURE — 97112 NEUROMUSCULAR REEDUCATION: CPT

## 2017-10-18 NOTE — PROGRESS NOTES
PT DAILY TREATMENT NOTE     Patient Name: Juany Sams  Date:10/18/2017  : 1960  [x]  Patient  Verified  Payor:  / Plan: [facility]  RETIREES AND DEPENDENTS / Product Type:  /    In time:430  Out time:524  Total Treatment Time (min): 47  Visit #: 10 of 14    Treatment Area: Midline thoracic back pain [M54.6]    SUBJECTIVE  Pain Level (0-10 scale): 0  Any medication changes, allergies to medications, adverse drug reactions, diagnosis change, or new procedure performed?: [x] No    [] Yes (see summary sheet for update)  Subjective functional status/changes:   [] No changes reported  \"I am good today. \"  OBJECTIVE    Modality rationale: decrease pain and increase tissue extensibility to improve the patients ability to perform ADL's with reduced pain levels.     Min Type Additional Details    [] Estim:  []Unatt       []IFC  []Premod                        []Other:  []w/ice   []w/heat  Position:  Location:    [] Estim: []Att    []TENS instruct  []NMES                    []Other:  []w/US   []w/ice   []w/heat  Position:  Location:    []  Traction: [] Cervical       []Lumbar                       [] Prone          []Supine                       []Intermittent   []Continuous Lbs:  [] before manual  [] after manual    []  Ultrasound: []Continuous   [] Pulsed                           []1MHz   []3MHz W/cm2:  Location:    []  Iontophoresis with dexamethasone         Location: [] Take home patch   [] In clinic   10 []  Ice     [x]  heat  []  Ice massage  []  Laser   []  Anodyne Position: supine  Location: l-spine    []  Laser with stim  []  Other:  Position:  Location:    []  Vasopneumatic Device Pressure:       [] lo [] med [] hi   Temperature: [] lo [] med [] hi   [x] Skin assessment post-treatment:  [x]intact []redness- no adverse reaction    []redness - adverse reaction:     24 min Therapeutic Exercise:  [x] See flow sheet :   Rationale: increase ROM, increase strength, improve coordination, improve balance and increase proprioception to improve the patients ability to perform ADL's with reduced pain levels. 10 min Neuromuscular Re-education:  []  See flow sheet :   Rationale: increase ROM, increase strength, improve coordination, improve balance and increase proprioception  to improve the patients ability to perform ADL's with reduced pain levels. With   [] TE   [] TA   [] neuro   [] other: Patient Education: [x] Review HEP    [] Progressed/Changed HEP based on:   [] positioning   [] body mechanics   [] transfers   [] heat/ice application    [] other:      Other Objective/Functional Measures:  Pain in left LE with right LTR, pain rolling onto left hip    Pain Level (0-10 scale) post treatment: 0    ASSESSMENT/Changes in Function: Pt tolerated standing exerices well. Pain with LTR to right and with rolling left. Resumed 3-way hip due to LE weakness. Patient will continue to benefit from skilled PT services to modify and progress therapeutic interventions, address functional mobility deficits, address ROM deficits, address strength deficits, analyze and address soft tissue restrictions, analyze and cue movement patterns, analyze and modify body mechanics/ergonomics, assess and modify postural abnormalities and instruct in home and community integration to attain remaining goals. []  See Plan of Care  []  See progress note/recertification  []  See Discharge Summary         Progress towards goals / Updated goals:   Short Term Goals: To be accomplished in 2 weeks:  1. Patient will be compliant with HEP as PT progresses. Status @ Eval: to be initiated 2nd visit  Last PN: reports compliance       2. Patient will have decreased pain to 4/10 at most with activities to be able to tolerate prolonged walking of 30 minutes. Status @ Eval: 10/10 at worst  Last PN: 8/10 at worst-- not met  [address] be accomplished in 4 weeks:  1.  Patient will be compliant with HEP as PT progresses. Status @ Eval: to be initiated 2nd visit  Last PN: progressing      2. Patient will have decreased pain to 2/10 at most with activities to be able to tolerate prolonged walking of 30 minutes. Status @ Eval: 10/10 at worst  Last PN: 8/10 at worst-- not met      3. Patient will have increased hip strength to 4/5 at least with activities to be able to maneuver self in and out of bed. Status @ Eval:    Strength     L(0-5) R (0-5) N/T   Knee Extension (L3,4) 5 5 []   Knee Flexion (S1,2) 5 5 []   Gluteus Curtis 5 5 []   Other Hip Add 4 4- []   Hip ABD: left 5, right 3+  Last PN: josefa hip abd 3+/5-- not met      4. Patient will have improved FOTO score of 7 points indicating improved overall functional mobility. Status @ Eval: 49  Last PN: 54-- 5 point improvement, progressing  Current:      PLAN  []  Upgrade activities as tolerated     [x]  Continue plan of care  []  Update interventions per flow sheet       []  Discharge due to:_  []  Other:_      Anum Frost, PT 10/18/2017  4:33 PM    No future appointments.

## 2017-10-19 ENCOUNTER — APPOINTMENT (OUTPATIENT)
Dept: PHYSICAL THERAPY | Age: 57
End: 2017-10-19
Payer: OTHER GOVERNMENT

## 2017-10-30 ENCOUNTER — HOSPITAL ENCOUNTER (OUTPATIENT)
Dept: PHYSICAL THERAPY | Age: 57
Discharge: HOME | End: 2017-10-30
Payer: OTHER GOVERNMENT

## 2017-10-30 PROCEDURE — 97112 NEUROMUSCULAR REEDUCATION: CPT

## 2017-10-30 PROCEDURE — 97110 THERAPEUTIC EXERCISES: CPT

## 2017-10-30 NOTE — PROGRESS NOTES
PT DAILY TREATMENT NOTE     Patient Name: Brendan Stewart  Date:10/30/2017  : 1960  [x]  Patient  Verified  Payor:  / Plan: [facility]  RETIREES AND DEPENDENTS / Product Type:  /    In time:431  Out time:521  Total Treatment Time (min): 50  Visit #: 11 of 14    Treatment Area: Midline thoracic back pain [M54.6]    SUBJECTIVE  Pain Level (0-10 scale): 0  Any medication changes, allergies to medications, adverse drug reactions, diagnosis change, or new procedure performed?: [x] No    [] Yes (see summary sheet for update)  Subjective functional status/changes:   [] No changes reported  \"We had to take a week off because of the injections. \"    OBJECTIVE    Modality rationale: decrease pain and increase tissue extensibility to improve the patients ability to perform ADL's with reduced pain levels.     Min Type Additional Details    [] Estim:  []Unatt       []IFC  []Premod                        []Other:  []w/ice   []w/heat  Position:  Location:    [] Estim: []Att    []TENS instruct  []NMES                    []Other:  []w/US   []w/ice   []w/heat  Position:  Location:    []  Traction: [] Cervical       []Lumbar                       [] Prone          []Supine                       []Intermittent   []Continuous Lbs:  [] before manual  [] after manual    []  Ultrasound: []Continuous   [] Pulsed                           []1MHz   []3MHz W/cm2:  Location:    []  Iontophoresis with dexamethasone         Location: [] Take home patch   [] In clinic   10 []  Ice     [x]  heat  []  Ice massage  []  Laser   []  Anodyne Position: supine  Location:l-spine    []  Laser with stim  []  Other:  Position:  Location:    []  Vasopneumatic Device Pressure:       [] lo [] med [] hi   Temperature: [] lo [] med [] hi   [x] Skin assessment post-treatment:  [x]intact []redness- no adverse reaction  24 min Therapeutic Exercise:  [x] See flow sheet :   Rationale: increase ROM, increase strength, improve coordination, improve balance and increase proprioception to improve the patients ability to perform ADL's with reduced pain levels.    16 min Neuromuscular Re-education:  []  See flow sheet :   Rationale: increase ROM, increase strength, improve coordination, improve balance and increase proprioception  to improve the patients ability to perform ADL's with reduced pain levels. With   [] TE   [] TA   [] neuro   [] other: Patient Education: [x] Review HEP    [] Progressed/Changed HEP based on:   [] positioning   [] body mechanics   [] transfers   [] heat/ice application    [] other:      Other Objective/Functional Measures:      Pain Level (0-10 scale) post treatment: 0    ASSESSMENT/Changes in Function: Pt tolerated session well with no increase in pain. Challenged with hip work. To d/c to HEP next week. Patient will continue to benefit from skilled PT services to modify and progress therapeutic interventions, address functional mobility deficits, address ROM deficits, address strength deficits, analyze and address soft tissue restrictions, analyze and cue movement patterns, analyze and modify body mechanics/ergonomics, assess and modify postural abnormalities and instruct in home and community integration to attain remaining goals. []  See Plan of Care  []  See progress note/recertification  []  See Discharge Summary         Progress towards goals / Updated goals:  Short Term Goals: To be accomplished in 2 weeks:  1. Patient will be compliant with HEP as PT progresses. Status @ Eval: to be initiated 2nd visit  Last PN: reports compliance       2. Patient will have decreased pain to 4/10 at most with activities to be able to tolerate prolonged walking of 30 minutes. Status @ Eval: 10/10 at worst  Last PN: 8/10 at worst-- not met  [address] be accomplished in 4 weeks:  1. Patient will be compliant with HEP as PT progresses.   Status @ Eval: to be initiated 2nd visit  Last PN: progressing      2. Patient will have decreased pain to 2/10 at most with activities to be able to tolerate prolonged walking of 30 minutes. Status @ Eval: 10/10 at worst  Last PN: 8/10 at worst-- not met      3. Patient will have increased hip strength to 4/5 at least with activities to be able to maneuver self in and out of bed. Status @ Eval:    Strength     L(0-5) R (0-5) N/T   Knee Extension (L3,4) 5 5 []   Knee Flexion (S1,2) 5 5 []   Gluteus Curtis 5 5 []   Other Hip Add 4 4- []   Hip ABD: left 5, right 3+  Last PN: josefa hip abd 3+/5-- not met  Current: Challenged with clams      4. Patient will have improved FOTO score of 7 points indicating improved overall functional mobility.   Status @ Eval: 49  Last PN: 54-- 5 point improvement, progressing  Current:52; address visit 10         PLAN  []  Upgrade activities as tolerated     []  Continue plan of care  []  Update interventions per flow sheet       []  Discharge due to:_  []  Other:_      Susan Schroeder, PT 10/30/2017  4:35 PM    Future Appointments  Date Time Provider Rickey Tom   11/1/2017 12:00 PM [place] [facility]

## 2017-11-01 ENCOUNTER — APPOINTMENT (OUTPATIENT)
Dept: PHYSICAL THERAPY | Age: 57
End: 2017-11-01
Payer: OTHER GOVERNMENT

## 2017-11-03 ENCOUNTER — HOSPITAL ENCOUNTER (OUTPATIENT)
Dept: PHYSICAL THERAPY | Age: 57
Discharge: HOME | End: 2017-11-03
Payer: OTHER GOVERNMENT

## 2017-11-03 PROCEDURE — 97110 THERAPEUTIC EXERCISES: CPT

## 2017-11-03 NOTE — PROGRESS NOTES
PT DAILY TREATMENT NOTE 12    Patient Name: Juany Sams  Date:11/3/2017  : 1960  [x]  Patient  Verified  Payor:  / Plan: [facility]  RETIREES AND DEPENDENTS / Product Type:  /    In time:133  Out time:223  Total Treatment Time (min): 50  Visit #: 12 of 14    Treatment Area: Midline thoracic back pain [M54.6]    SUBJECTIVE  Pain Level (0-10 scale): 0  Any medication changes, allergies to medications, adverse drug reactions, diagnosis change, or new procedure performed?: [x] No    [] Yes (see summary sheet for update)  Subjective functional status/changes:   [] No changes reported  \"I rode all the way here. \"    OBJECTIVE    Modality rationale: decrease pain and increase tissue extensibility to improve the patients ability to perform ADL's with reduced pain levels.     Min Type Additional Details    [] Estim:  []Unatt       []IFC  []Premod                        []Other:  []w/ice   []w/heat  Position:  Location:    [] Estim: []Att    []TENS instruct  []NMES                    []Other:  []w/US   []w/ice   []w/heat  Position:  Location:    []  Traction: [] Cervical       []Lumbar                       [] Prone          []Supine                       []Intermittent   []Continuous Lbs:  [] before manual  [] after manual    []  Ultrasound: []Continuous   [] Pulsed                           []1MHz   []3MHz W/cm2:  Location:    []  Iontophoresis with dexamethasone         Location: [] Take home patch   [] In clinic   10 []  Ice     [x]  heat  []  Ice massage  []  Laser   []  Anodyne Position:supine  Location:lspine    []  Laser with stim  []  Other:  Position:  Location:    []  Vasopneumatic Device Pressure:       [] lo [] med [] hi   Temperature: [] lo [] med [] hi   [x] Skin assessment post-treatment:  [x]intact []redness- no adverse reaction  40 min Therapeutic Exercise:  [x] See flow sheet :   Rationale: increase ROM, increase strength, improve coordination, improve balance and increase proprioception to improve the patients ability to perform ADL's with reduced pain levels. With   [] TE   [] TA   [] neuro   [] other: Patient Education: [x] Review HEP    [] Progressed/Changed HEP based on:   [] positioning   [] body mechanics   [] transfers   [] heat/ice application    [] other:      Other Objective/Functional Measures:      Pain Level (0-10 scale) post treatment: 0    ASSESSMENT/Changes in Function: Tolerated session well. Pain remains with rotation to right. Patient will continue to benefit from skilled PT services to modify and progress therapeutic interventions, address functional mobility deficits, address ROM deficits, address strength deficits, analyze and address soft tissue restrictions, analyze and cue movement patterns, analyze and modify body mechanics/ergonomics, assess and modify postural abnormalities and instruct in home and community integration to attain remaining goals. []  See Plan of Care  []  See progress note/recertification  []  See Discharge Summary         Progress towards goals / Updated goals:  Short Term Goals: To be accomplished in 2 weeks:  1. Patient will be compliant with HEP as PT progresses. Status @ Eval: to be initiated 2nd visit  Last PN: reports compliance       2. Patient will have decreased pain to 4/10 at most with activities to be able to tolerate prolonged walking of 30 minutes. Status @ Eval: 10/10 at worst  Last PN: 8/10 at worst-- not met  [address] be accomplished in 4 weeks:  1. Patient will be compliant with HEP as PT progresses. Status @ Eval: to be initiated 2nd visit  Last PN: progressing      2. Patient will have decreased pain to 2/10 at most with activities to be able to tolerate prolonged walking of 30 minutes. Status @ Eval: 10/10 at worst  Last PN: 8/10 at worst-- not met      3.  Patient will have increased hip strength to 4/5 at least with activities to be able to maneuver self in and out of bed.  Status @ Eval:    Strength     L(0-5) R (0-5) N/T   Knee Extension (L3,4) 5 5 []   Knee Flexion (S1,2) 5 5 []   Gluteus Curtis 5 5 []   Other Hip Add 4 4- []   Hip ABD: left 5, right 3+  Last PN: josefa hip abd 3+/5-- not met  Current: Challenged with clams      4. Patient will have improved FOTO score of 7 points indicating improved overall functional mobility. Status @ Eval: 49  Last PN: 54-- 5 point improvement, progressing  Current:52; address visit 10      PLAN  []  Upgrade activities as tolerated     [x]  Continue plan of care  []  Update interventions per flow sheet       []  Discharge due to:_  []  Other:_      Yareli Howe, PT 11/3/2017  1:38 PM    No future appointments.

## 2017-11-06 ENCOUNTER — HOSPITAL ENCOUNTER (OUTPATIENT)
Dept: PHYSICAL THERAPY | Age: 57
Discharge: HOME | End: 2017-11-06
Payer: OTHER GOVERNMENT

## 2017-11-06 PROCEDURE — 97110 THERAPEUTIC EXERCISES: CPT

## 2017-11-06 NOTE — PROGRESS NOTES
PT DAILY TREATMENT NOTE     Patient Name: Alyssa Bolivar  Date:2017  : 1960  [x]  Patient  Verified  Payor:  / Plan: [facility]  RETIREES AND DEPENDENTS / Product Type:  /    In time:400  Out time:445  Total Treatment Time (min): 45  Visit #: 13 of 14    Treatment Area: Midline thoracic back pain [M54.6]    SUBJECTIVE  Pain Level (0-10 scale): 0  Any medication changes, allergies to medications, adverse drug reactions, diagnosis change, or new procedure performed?: [x] No    [] Yes (see summary sheet for update)  Subjective functional status/changes:   [] No changes reported  \"No, I'm good (no pain). \"    OBJECTIVE    44 min Therapeutic Exercise:  [] See flow sheet :   Rationale: increase ROM, increase strength, improve coordination, improve balance and increase proprioception to improve the patients ability to perform ADL's with reduced pain levels. With   [] TE   [] TA   [] neuro   [] other: Patient Education: [x] Review HEP    [] Progressed/Changed HEP based on:   [] positioning   [] body mechanics   [] transfers   [] heat/ice application    [] other:      Other Objective/Functional Measures:      Pain Level (0-10 scale) post treatment: 0    ASSESSMENT/Changes in Function: Pt tolerated session well. Reports improved pain levels and prepped to d/c next visit. Patient will continue to benefit from skilled PT services to modify and progress therapeutic interventions, address functional mobility deficits, address ROM deficits, address strength deficits, analyze and address soft tissue restrictions, analyze and cue movement patterns, analyze and modify body mechanics/ergonomics, assess and modify postural abnormalities and instruct in home and community integration to attain remaining goals.      []  See Plan of Care  []  See progress note/recertification  []  See Discharge Summary         Progress towards goals / Updated goals:  Short Term Goals: To be accomplished in 2 weeks:  1. Patient will be compliant with HEP as PT progresses. Status @ Eval: to be initiated 2nd visit  Last PN: reports compliance       2. Patient will have decreased pain to 4/10 at most with activities to be able to tolerate prolonged walking of 30 minutes. Status @ Eval: 10/10 at worst  Last PN: 8/10 at worst-- not met  [address] be accomplished in 4 weeks:  1. Patient will be compliant with HEP as PT progresses. Status @ Eval: to be initiated 2nd visit  Last PN: progressing      2. Patient will have decreased pain to 2/10 at most with activities to be able to tolerate prolonged walking of 30 minutes. Status @ Eval: 10/10 at worst  Last PN: 8/10 at worst-- not met      3. Patient will have increased hip strength to 4/5 at least with activities to be able to maneuver self in and out of bed. Status @ Eval:    Strength     L(0-5) R (0-5) N/T   Knee Extension (L3,4) 5 5 []   Knee Flexion (S1,2) 5 5 []   Gluteus Curtis 5 5 []   Other Hip Add 4 4- []   Hip ABD: left 5, right 3+  Last PN: josefa hip abd 3+/5-- not met  Current: Challenged with clams      4. Patient will have improved FOTO score of 7 points indicating improved overall functional mobility.   Status @ Eval: 49  Last PN: 54-- 5 point improvement, progressing  Current:52 at visit 10-- address visit 14 (d/c)    PLAN  []  Upgrade activities as tolerated     [x]  Continue plan of care  []  Update interventions per flow sheet       []  Discharge due to:_  [x]  Other: discharge next visit following goal update      Alvin Zamora, PT 11/6/2017  4:03 PM    Future Appointments  Date Time Provider Rickey Tom   11/6/2017 6:00 PM Latasha Johnson [facility]   [facility]10[facility]2017 2:[facility]         \

## 2017-11-08 ENCOUNTER — HOSPITAL ENCOUNTER (OUTPATIENT)
Dept: PHYSICAL THERAPY | Age: 57
Discharge: HOME | End: 2017-11-08
Payer: OTHER GOVERNMENT

## 2017-11-08 PROCEDURE — 97110 THERAPEUTIC EXERCISES: CPT

## 2017-11-08 NOTE — PROGRESS NOTES
PT DAILY TREATMENT NOTE     Patient Name: Obed Daly  Date:2017  : 1960  [x]  Patient  Verified  Payor:  / Plan: [facility]  RETIREES AND DEPENDENTS / Product Type:  /    In time:305  Out time:330  Total Treatment Time (min): 25  Visit #: 14 of 14    Treatment Area: Midline thoracic back pain [M54.6]    SUBJECTIVE  Pain Level (0-10 scale): 1/10  Any medication changes, allergies to medications, adverse drug reactions, diagnosis change, or new procedure performed?: [x] No    [] Yes (see summary sheet for update)  Subjective functional status/changes:   [x] No changes reported      OBJECTIVE    Modality rationale:    Min Type Additional Details    [] Estim:  []Unatt       []IFC  []Premod                        []Other:  []w/ice   []w/heat  Position:  Location:    [] Estim: []Att    []TENS instruct  []NMES                    []Other:  []w/US   []w/ice   []w/heat  Position:  Location:    []  Traction: [] Cervical       []Lumbar                       [] Prone          []Supine                       []Intermittent   []Continuous Lbs:  [] before manual  [] after manual    []  Ultrasound: []Continuous   [] Pulsed                           []1MHz   []3MHz W/cm2:  Location:    []  Iontophoresis with dexamethasone         Location: [] Take home patch   [] In clinic    []  Ice     []  heat  []  Ice massage  []  Laser   []  Anodyne Position:  Location:    []  Laser with stim  []  Other:  Position:  Location:    []  Vasopneumatic Device Pressure:       [] lo [] med [] hi   Temperature: [] lo [] med [] hi   [] Skin assessment post-treatment:  []intact []redness- no adverse reaction    []redness - adverse reaction:      min []Eval                  []Re-Eval       25 min Therapeutic Exercise:  [x] See flow sheet :   Rationale: increase ROM, increase strength and increase proprioception to improve the patients ability to improve mobility     min Therapeutic Activity:  []  See flow sheet : min Neuromuscular Re-education:  [x]  See flow sheet :        min Manual Therapy:          min Gait Training:  ___ feet with ___ device on level surfaces with ___ level of assist   Rationale: With   [] TE   [] TA   [] neuro   [] other: Patient Education: [x] Review HEP    [] Progressed/Changed HEP based on:   [] positioning   [] body mechanics   [] transfers   [] heat/ice application    [] other:      Other Objective/Functional Measures: see goal review     Pain Level (0-10 scale) post treatment: 1/10    ASSESSMENT/Changes in Function: FOTO score improved to meet goal.       []  See Plan of Care  []  See progress note/recertification  [x]  See Discharge Summary         Progress towards goals / Updated goals:  Short Term Goals: To be accomplished in 2 weeks:  1. Patient will be compliant with HEP as PT progresses. Status @ Eval: to be initiated 2nd visit  Last PN: reports compliance   Current Status: MET with continued compliance reported      2. Patient will have decreased pain to 4/10 at most with activities to be able to tolerate prolonged walking of 30 minutes. Status @ Eval: 10/10 at worst  Last PN: 8/10 at worst-- not met  Current Status: progressin/10 at [facility] [address] be accomplished in 4 weeks:  1. Patient will be compliant with HEP as PT progresses. Status @ Eval: to be initiated 2nd visit  Last PN: MET with continued compliance reported and independence demonstrated in clinic      2. Patient will have decreased pain to 2/10 at most with activities to be able to tolerate prolonged walking of 30 minutes. Status @ Eval: 10/10 at worst  Last PN: 8/10 at worst-- not met  Current Status: progressin/10 at worst      3. Patient will have increased hip strength to 4/5 at least with activities to be able to maneuver self in and out of bed.   Status @ Eval:    Strength     L(0-5) R (0-5) N/T   Knee Extension (L3,4) 5 5 []   Knee Flexion (S1,2) 5 5 []   Gluteus Curtis 5 5 []   Other Hip Add 4 4- []   Hip ABD: left 5, right 3+  Last PN: josefa hip abd 3+/5-- not met  Current: ABD: 4/5 bilaterally: MET      4. Patient will have improved FOTO score of 7 points indicating improved overall functional mobility. Status @ Eval: 49  Last PN: 54-- 5 point improvement, progressing  Current:MET: 56/100         PLAN  []  Upgrade activities as tolerated     []  Continue plan of care  []  Update interventions per flow sheet       [x]  Discharge due to: completion of program with progression towards/meeting of goals  []  Other:_      Vidal Kidney, PT 11/8/2017  3:01 PM    No future appointments.

## 2017-11-10 ENCOUNTER — APPOINTMENT (OUTPATIENT)
Dept: PHYSICAL THERAPY | Age: 57
End: 2017-11-10
Payer: OTHER GOVERNMENT

## 2017-11-13 NOTE — PROGRESS NOTES
[facility] [place], [address] [place] [address]  Phone: [phone]   Fax: [phone]    Discharge Summary    Patient name: Tulio Ahn     Start of Care: 9/15/17  Referral source: Hollie Nascimento MD    : 1960  Medical/Treatment Diagnosis: Midline thoracic back pain [M54.6]  Onset Date:6 months  Prior Hospitalization: see medical history   Provider#: [id number]  Comorbidities: nerve ablation performed in cervical and lumbar region within the past two years,   Prior Level of Function:patient had to modify some activities secondary to pain prior to exacerbation however now worse   Medications: Verified on Patient Summary List    Visits from Start of Care: 14    Missed Visits: 6    Reporting Period : 10/18/17 to 17    Short Term Goals: To be accomplished in 2 weeks:  1. Patient will be compliant with HEP as PT progresses. Status @ Eval: to be initiated 2nd visit  Last PN: reports compliance   Current Status: MET with continued compliance reported      2. Patient will have decreased pain to 4/10 at most with activities to be able to tolerate prolonged walking of 30 minutes. Status @ Eval: 10/10 at worst  Last PN: 8/10 at worst-- not met  Current Status: progressin/10 at [facility]. be accomplished in 4 weeks:  1. Patient will be compliant with HEP as PT progresses. Status @ Eval: to be initiated 2nd visit  Last PN: MET with continued compliance reported and independence demonstrated in clinic      2. Patient will have decreased pain to 2/10 at most with activities to be able to tolerate prolonged walking of 30 minutes. Status @ Eval: 10/10 at worst  Last PN: 8/10 at worst-- not met  Current Status: progressin/10 at worst      3. Patient will have increased hip strength to 4/5 at least with activities to be able to maneuver self in and out of bed.   Status @ Eval:    Strength     L(0-5) R (0-5) N/T   Knee Extension (L3,4) 5 5 []   Knee Flexion (S1,2) 5 5 []   Gluteus Curtis 5 5 []   Other Hip Add 4 4- []   Hip ABD: left 5, right 3+  Last PN: josefa hip abd 3+/5-- not met  Current: ABD: 4/5 bilaterally: MET      4. Patient will have improved FOTO score of 7 points indicating improved overall functional mobility. Status @ Eval: 49  Last PN: 54-- 5 point improvement, progressing  Current:MET: 56/100          Assessment/ Summary of Care: Treatment included ROM/stretching, LE strengthening, core stability, and postural exercises. Pt to continue on own with self-monitored exercises at this time.     RECOMMENDATIONS:  [x]Discontinue therapy: [x]Patient has reached or is progressing toward set goals      []Patient is non-compliant or has abdicated      []Due to lack of appreciable progress towards set goals    Delvis Alanis, PT 11/13/2017 2:04 PM